# Patient Record
Sex: FEMALE | Race: WHITE | Employment: OTHER | URBAN - METROPOLITAN AREA
[De-identification: names, ages, dates, MRNs, and addresses within clinical notes are randomized per-mention and may not be internally consistent; named-entity substitution may affect disease eponyms.]

---

## 2019-05-22 ENCOUNTER — HOSPITAL ENCOUNTER (OUTPATIENT)
Age: 64
Discharge: HOME | End: 2019-05-24
Payer: COMMERCIAL

## 2019-05-22 PROCEDURE — 87660 TRICHOMONAS VAGIN DIR PROBE: CPT

## 2019-05-22 PROCEDURE — 87510 GARDNER VAG DNA DIR PROBE: CPT

## 2019-05-22 PROCEDURE — 87480 CANDIDA DNA DIR PROBE: CPT

## 2019-05-24 LAB
CANDIDA SPECIES, DNA PROBE: NEGATIVE
GARDNERELLA VAGINALIS, DNA PROBE: NEGATIVE
TRICHOMONAS VAGINALIS DNA: NEGATIVE

## 2021-09-23 ENCOUNTER — HOSPITAL ENCOUNTER (OUTPATIENT)
Age: 66
Discharge: HOME | End: 2021-09-25

## 2021-09-23 PROCEDURE — 88305 TISSUE EXAM BY PATHOLOGIST: CPT

## 2022-10-04 ENCOUNTER — HOSPITAL ENCOUNTER (OUTPATIENT)
Dept: GENERAL RADIOLOGY | Age: 67
Discharge: HOME | End: 2022-10-06
Payer: MEDICARE

## 2022-10-04 DIAGNOSIS — Z78.0 ASYMPTOMATIC AGE-RELATED POSTMENOPAUSAL STATE: ICD-10-CM

## 2022-10-04 PROCEDURE — 77080 DXA BONE DENSITY AXIAL: CPT

## 2022-10-24 ENCOUNTER — PREP FOR PROCEDURE (OUTPATIENT)
Dept: PODIATRY | Age: 67
End: 2022-10-24

## 2022-10-24 RX ORDER — SODIUM CHLORIDE 0.9 % (FLUSH) 0.9 %
5-40 SYRINGE (ML) INJECTION PRN
Status: CANCELLED | OUTPATIENT
Start: 2022-10-24

## 2022-10-24 RX ORDER — SODIUM CHLORIDE 0.9 % (FLUSH) 0.9 %
5-40 SYRINGE (ML) INJECTION EVERY 12 HOURS SCHEDULED
Status: CANCELLED | OUTPATIENT
Start: 2022-10-24

## 2022-10-24 RX ORDER — SODIUM CHLORIDE 9 MG/ML
INJECTION, SOLUTION INTRAVENOUS PRN
Status: CANCELLED | OUTPATIENT
Start: 2022-10-24

## 2022-10-25 RX ORDER — LEVOTHYROXINE SODIUM 0.05 MG/1
50 TABLET ORAL DAILY
COMMUNITY
Start: 2022-10-03

## 2022-10-25 RX ORDER — METOPROLOL SUCCINATE 25 MG/1
25 TABLET, EXTENDED RELEASE ORAL DAILY
Status: ON HOLD | COMMUNITY
Start: 2022-09-30 | End: 2022-11-08 | Stop reason: HOSPADM

## 2022-10-25 RX ORDER — ESCITALOPRAM OXALATE 10 MG/1
10 TABLET ORAL DAILY
COMMUNITY
Start: 2022-09-30

## 2022-10-25 RX ORDER — ATORVASTATIN CALCIUM 10 MG/1
10 TABLET, FILM COATED ORAL NIGHTLY
COMMUNITY
Start: 2022-09-30

## 2022-10-25 NOTE — PROGRESS NOTES
[facility] PRE-ADMISSION TESTING INSTRUCTIONS    The Preadmission Testing patient is instructed accordingly using the following criteria (check applicable):    ARRIVAL INSTRUCTIONS:  [x] Parking the day of Surgery is located in the Main Entrance lot. Upon entering the door, make an immediate right to the surgery reception desk    [x] Bring photo ID and insurance card    [x] Bring in a copy of Living will or Durable Power of  papers. [x] Please be sure to arrange for responsible adult to provide transportation to and from the hospital    [x] Please arrange for responsible adult to be with you for the 24 hour period post procedure due to having anesthesia    [x] If you awake am of surgery not feeling well or have temperature >100 please call [phone]    GENERAL INSTRUCTIONS:    [x] Nothing by mouth after midnight, including gum, candy, mints or water     You may brush your teeth, but do not sw[x]allow any water    [x] Take medications as instructed with 1-2 oz of water    [x] Stop herbal supplements and vitamins 5 days prior to procedure    [] Follow preop dosing of blood thinners per physician instructions    [] Take 1/2 dose of evening insulin, but no insulin after midnight    [] No oral diabetic medications after midnight    [] If diabetic and have low blood sugar or feel symptomatic, take 1-2oz apple juice only    [] Bring inhalers day of surgery    [] Bring C-PAP/ Bi-Pap day of surgery    [] Bring urine specimen day of surgery    [x] Shower or bath with soap, lather and rinse well, AM of Surgery, no lotion, powders or creams to surgical site    [] Follow bowel prep as instructed per surgeon    [x] No tobacco products within 24 hours of surgery     [x] No alcohol or illegal drug use within 24 hours of surgery.     [x] Jewelry, body piercing's, eyeglasses, contact lenses and dentures are not permitted into surgery (bring cases)      [x] Please do not wear any nail polish, make up or hair products on the day of surgery    [x] You can expect a call the business day prior to procedure to notify you if your arrival time changes    [x] If you receive a survey after surgery we would greatly appreciate your comments    [] Parent/guardian of a minor must accompany their child and remain on the premises  the entire time they are under our care     [] Pediatric patients may bring favorite toy, blanket or comfort item with them    [] A caregiver or family member must remain with the patient during their stay if they are mentally handicapped, have dementia, disoriented or unable to use a call light or would be a safety concern if left unattended    [x] Please notify surgeon if you develop any illness between now and time of surgery (cold, cough, sore throat, fever, nausea, vomiting) or any signs of infections  including skin, wounds, and dental.    [x]  The Outpatient Pharmacy is available to fill your prescription here on your day of surgery, ask your preop nurse for details    [] Other instructions    EDUCATIONAL MATERIALS PROVIDED:    [] PAT Preoperative Education Packet/Booklet     [] Medication List    [] Transfusion bracelet applied with instructions    [] Shower with soap, lather and rinse well, and use CHG wipes provided the evening before surgery as instructed    [] Incentive spirometer with instructions

## 2022-10-26 ENCOUNTER — ANESTHESIA (OUTPATIENT)
Dept: OPERATING ROOM | Age: 67
End: 2022-10-26
Payer: MEDICARE

## 2022-10-26 ENCOUNTER — APPOINTMENT (OUTPATIENT)
Dept: GENERAL RADIOLOGY | Age: 67
End: 2022-10-26
Attending: PODIATRIST
Payer: MEDICARE

## 2022-10-26 ENCOUNTER — ANESTHESIA EVENT (OUTPATIENT)
Dept: OPERATING ROOM | Age: 67
End: 2022-10-26
Payer: MEDICARE

## 2022-10-26 ENCOUNTER — HOSPITAL ENCOUNTER (OUTPATIENT)
Age: 67
Setting detail: OUTPATIENT SURGERY
Discharge: HOME | End: 2022-10-26
Attending: PODIATRIST | Admitting: PODIATRIST
Payer: MEDICARE

## 2022-10-26 VITALS
RESPIRATION RATE: 18 BRPM | DIASTOLIC BLOOD PRESSURE: 73 MMHG | HEART RATE: 55 BPM | BODY MASS INDEX: 33.04 KG/M2 | OXYGEN SATURATION: 96 % | TEMPERATURE: 97.6 F | SYSTOLIC BLOOD PRESSURE: 154 MMHG | HEIGHT: 61 IN | WEIGHT: 175 LBS

## 2022-10-26 PROCEDURE — 2500000003 HC RX 250 WO HCPCS: Performed by: PODIATRIST

## 2022-10-26 PROCEDURE — 3700000000 HC ANESTHESIA ATTENDED CARE: Performed by: PODIATRIST

## 2022-10-26 PROCEDURE — 2580000003 HC RX 258: Performed by: PODIATRIST

## 2022-10-26 PROCEDURE — 7100000001 HC PACU RECOVERY - ADDTL 15 MIN: Performed by: PODIATRIST

## 2022-10-26 PROCEDURE — 7100000000 HC PACU RECOVERY - FIRST 15 MIN: Performed by: PODIATRIST

## 2022-10-26 PROCEDURE — 3600000012 HC SURGERY LEVEL 2 ADDTL 15MIN: Performed by: PODIATRIST

## 2022-10-26 PROCEDURE — 3209999900 FLUORO FOR SURGICAL PROCEDURES

## 2022-10-26 PROCEDURE — 7100000011 HC PHASE II RECOVERY - ADDTL 15 MIN: Performed by: PODIATRIST

## 2022-10-26 PROCEDURE — 2709999900 HC NON-CHARGEABLE SUPPLY: Performed by: PODIATRIST

## 2022-10-26 PROCEDURE — 6360000002 HC RX W HCPCS: Performed by: PODIATRIST

## 2022-10-26 PROCEDURE — 3700000001 HC ADD 15 MINUTES (ANESTHESIA): Performed by: PODIATRIST

## 2022-10-26 PROCEDURE — 2720000010 HC SURG SUPPLY STERILE: Performed by: PODIATRIST

## 2022-10-26 PROCEDURE — 6360000002 HC RX W HCPCS

## 2022-10-26 PROCEDURE — 2500000003 HC RX 250 WO HCPCS

## 2022-10-26 PROCEDURE — 3600000002 HC SURGERY LEVEL 2 BASE: Performed by: PODIATRIST

## 2022-10-26 PROCEDURE — 7100000010 HC PHASE II RECOVERY - FIRST 15 MIN: Performed by: PODIATRIST

## 2022-10-26 PROCEDURE — 6370000000 HC RX 637 (ALT 250 FOR IP): Performed by: PODIATRIST

## 2022-10-26 PROCEDURE — C1713 ANCHOR/SCREW BN/BN,TIS/BN: HCPCS | Performed by: PODIATRIST

## 2022-10-26 DEVICE — IMPLANTABLE DEVICE
Type: IMPLANTABLE DEVICE | Site: FOOT | Status: FUNCTIONAL
Brand: CHARLOTTE

## 2022-10-26 DEVICE — HEADED COMPRESSION SCREW
Type: IMPLANTABLE DEVICE | Site: FOOT | Status: FUNCTIONAL
Brand: DART-FIRE

## 2022-10-26 RX ORDER — EPHEDRINE SULFATE/0.9% NACL/PF 50 MG/5 ML
SYRINGE (ML) INTRAVENOUS PRN
Status: DISCONTINUED | OUTPATIENT
Start: 2022-10-26 | End: 2022-10-26 | Stop reason: SDUPTHER

## 2022-10-26 RX ORDER — DEXAMETHASONE SODIUM PHOSPHATE 4 MG/ML
INJECTION, SOLUTION INTRA-ARTICULAR; INTRALESIONAL; INTRAMUSCULAR; INTRAVENOUS; SOFT TISSUE PRN
Status: DISCONTINUED | OUTPATIENT
Start: 2022-10-26 | End: 2022-10-26 | Stop reason: SDUPTHER

## 2022-10-26 RX ORDER — SODIUM CHLORIDE 9 MG/ML
INJECTION, SOLUTION INTRAVENOUS PRN
Status: DISCONTINUED | OUTPATIENT
Start: 2022-10-26 | End: 2022-10-26 | Stop reason: HOSPADM

## 2022-10-26 RX ORDER — BUPIVACAINE HYDROCHLORIDE 5 MG/ML
INJECTION, SOLUTION EPIDURAL; INTRACAUDAL PRN
Status: DISCONTINUED | OUTPATIENT
Start: 2022-10-26 | End: 2022-10-26 | Stop reason: ALTCHOICE

## 2022-10-26 RX ORDER — BACITRACIN ZINC 500 [USP'U]/G
OINTMENT TOPICAL PRN
Status: DISCONTINUED | OUTPATIENT
Start: 2022-10-26 | End: 2022-10-26 | Stop reason: ALTCHOICE

## 2022-10-26 RX ORDER — SODIUM CHLORIDE 0.9 % (FLUSH) 0.9 %
5-40 SYRINGE (ML) INJECTION PRN
Status: DISCONTINUED | OUTPATIENT
Start: 2022-10-26 | End: 2022-10-26 | Stop reason: HOSPADM

## 2022-10-26 RX ORDER — LIDOCAINE HYDROCHLORIDE AND EPINEPHRINE BITARTRATE 20; .01 MG/ML; MG/ML
INJECTION, SOLUTION SUBCUTANEOUS PRN
Status: DISCONTINUED | OUTPATIENT
Start: 2022-10-26 | End: 2022-10-26 | Stop reason: ALTCHOICE

## 2022-10-26 RX ORDER — MIDAZOLAM HYDROCHLORIDE 1 MG/ML
INJECTION INTRAMUSCULAR; INTRAVENOUS PRN
Status: DISCONTINUED | OUTPATIENT
Start: 2022-10-26 | End: 2022-10-26 | Stop reason: SDUPTHER

## 2022-10-26 RX ORDER — PROPOFOL 10 MG/ML
INJECTION, EMULSION INTRAVENOUS CONTINUOUS PRN
Status: DISCONTINUED | OUTPATIENT
Start: 2022-10-26 | End: 2022-10-26 | Stop reason: SDUPTHER

## 2022-10-26 RX ORDER — KETOROLAC TROMETHAMINE 30 MG/ML
INJECTION, SOLUTION INTRAMUSCULAR; INTRAVENOUS PRN
Status: DISCONTINUED | OUTPATIENT
Start: 2022-10-26 | End: 2022-10-26 | Stop reason: SDUPTHER

## 2022-10-26 RX ORDER — LIDOCAINE HYDROCHLORIDE 20 MG/ML
INJECTION, SOLUTION INFILTRATION; PERINEURAL PRN
Status: DISCONTINUED | OUTPATIENT
Start: 2022-10-26 | End: 2022-10-26 | Stop reason: ALTCHOICE

## 2022-10-26 RX ORDER — SUCCINYLCHOLINE/SOD CL,ISO/PF 100 MG/5ML
SYRINGE (ML) INTRAVENOUS PRN
Status: DISCONTINUED | OUTPATIENT
Start: 2022-10-26 | End: 2022-10-26 | Stop reason: SDUPTHER

## 2022-10-26 RX ORDER — ROCURONIUM BROMIDE 10 MG/ML
INJECTION, SOLUTION INTRAVENOUS PRN
Status: DISCONTINUED | OUTPATIENT
Start: 2022-10-26 | End: 2022-10-26 | Stop reason: SDUPTHER

## 2022-10-26 RX ORDER — ONDANSETRON 2 MG/ML
INJECTION INTRAMUSCULAR; INTRAVENOUS PRN
Status: DISCONTINUED | OUTPATIENT
Start: 2022-10-26 | End: 2022-10-26 | Stop reason: SDUPTHER

## 2022-10-26 RX ORDER — SODIUM CHLORIDE 0.9 % (FLUSH) 0.9 %
5-40 SYRINGE (ML) INJECTION EVERY 12 HOURS SCHEDULED
Status: DISCONTINUED | OUTPATIENT
Start: 2022-10-26 | End: 2022-10-26 | Stop reason: HOSPADM

## 2022-10-26 RX ADMIN — SUGAMMADEX 159 MG: 100 INJECTION, SOLUTION INTRAVENOUS at 09:40

## 2022-10-26 RX ADMIN — SODIUM CHLORIDE: 9 INJECTION, SOLUTION INTRAVENOUS at 07:00

## 2022-10-26 RX ADMIN — Medication 15 MG: at 07:46

## 2022-10-26 RX ADMIN — MIDAZOLAM 2 MG: 1 INJECTION INTRAMUSCULAR; INTRAVENOUS at 06:58

## 2022-10-26 RX ADMIN — KETOROLAC TROMETHAMINE 30 MG: 30 INJECTION, SOLUTION INTRAMUSCULAR at 09:40

## 2022-10-26 RX ADMIN — DEXAMETHASONE SODIUM PHOSPHATE 8 MG: 4 INJECTION, SOLUTION INTRAMUSCULAR; INTRAVENOUS at 07:55

## 2022-10-26 RX ADMIN — Medication 200 MG: at 07:20

## 2022-10-26 RX ADMIN — ONDANSETRON 4 MG: 2 INJECTION INTRAMUSCULAR; INTRAVENOUS at 07:55

## 2022-10-26 RX ADMIN — PROPOFOL 100 MCG/KG/MIN: 10 INJECTION, EMULSION INTRAVENOUS at 07:04

## 2022-10-26 RX ADMIN — WATER 2000 MG: 1 INJECTION INTRAMUSCULAR; INTRAVENOUS; SUBCUTANEOUS at 07:05

## 2022-10-26 RX ADMIN — ROCURONIUM BROMIDE 20 MG: 10 INJECTION, SOLUTION INTRAVENOUS at 07:52

## 2022-10-26 RX ADMIN — Medication 15 MG: at 08:28

## 2022-10-26 RX ADMIN — SODIUM CHLORIDE: 9 INJECTION, SOLUTION INTRAVENOUS at 08:22

## 2022-10-26 ASSESSMENT — PAIN - FUNCTIONAL ASSESSMENT: PAIN_FUNCTIONAL_ASSESSMENT: NONE - DENIES PAIN

## 2022-10-26 ASSESSMENT — PAIN SCALES - GENERAL
PAINLEVEL_OUTOF10: 0

## 2022-10-26 NOTE — ANESTHESIA POSTPROCEDURE EVALUATION
Department of Anesthesiology  Postprocedure Note    Patient: Samy Telles  MRN: [id number]  YOB: 1955  Date of evaluation: 10/26/2022      Procedure Summary     Date: 10/26/22 Room / Location: [facility] OR 07 / [facility]    Anesthesia Start: 6179 Anesthesia Stop:     Procedure: REDUCTION OF HALLUX VALGUS DEFORMITY AND SUBLUXED 2ND 3RD LEFT TOES CORRECTION OF HALLUX VALGUS BY JAROD PROCEDURE WEIL OSTEOTOMY 2ND METATARSAL SOFT TISSUE RELEASE 3RD METATARSOPHALANGEAL JOINT ALL LEFT FOOT (Left: Foot) Diagnosis:       Bunion of great toe of left foot      Subluxation of metatarsophalangeal joint of lesser toe of left foot, initial encounter      Hammer toe of left foot      (Bunion of great toe of left foot [M21.612])      (Subluxation of metatarsophalangeal joint of lesser toe of left foot, initial encounter [E89.672M])      (Hammer toe of left foot [M20.42])    Surgeons: Mary Hood DPM Responsible Provider: Shreya Au MD    Anesthesia Type: MAC ASA Status: 2          Anesthesia Type: No value filed.     Julisa Phase I: Julisa Score: 10    Julisa Phase II:        Anesthesia Post Evaluation    Patient location during evaluation: PACU  Patient participation: complete - patient participated  Level of consciousness: awake  Pain score: 3  Airway patency: patent  Nausea & Vomiting: no nausea and no vomiting  Complications: no  Cardiovascular status: blood pressure returned to baseline  Respiratory status: acceptable  Hydration status: euvolemic

## 2022-10-26 NOTE — BRIEF OP NOTE
Brief Postoperative Note      Patient: Garry Willis  YOB: 1955  MRN: [id number]    Date of Procedure: 10/26/2022    Pre-Op Diagnosis: Bunion of great toe of left foot [M21.612]  Subluxation of metatarsophalangeal joint of lesser toe of left foot, initial encounter [S93.145A]  Hammer toe of left foot [M20.42]    Post-Op Diagnosis: Same       Procedure(s):  REDUCTION OF HALLUX VALGUS DEFORMITY AND SUBLUXED 2ND 3RD LEFT TOES CORRECTION OF HALLUX VALGUS BY JAROD PROCEDURE WEIL OSTEOTOMY 2ND METATARSAL SOFT TISSUE RELEASE 3RD METATARSOPHALANGEAL JOINT 4TH TOE CAPSULOTOMY ALL LEFT FOOT    Surgeon(s):  LUPE Wilder DPM    Assistant: Devin Vivar DPM, PGY-2  * No surgical staff found *    Anesthesia: General    Estimated Blood Loss (mL): less than 50     Complications: Patient was converted to general anesthesia    Specimens:   * No specimens in log *    Implants:  Implant Name Type Inv.  Item Serial No.  Lot No. LRB No. Used Action   SCREW BNE SM L18MM DIA2MM AQUA TI ST SELF DRL ANDRE [facility] - [id number]  SCREW BNE SM L18MM DIA2MM AQUA TI ST SELF DRL ANDRE [facility]  [employer] [facility]  Left 1 Implanted   STAPLE BNE FIX L9MM S STL INTERAXIS [employer]UIK CAMEL[employer] - [id number]  STAPLE BNE FIX L9MM S STL INTERAXIS QUIK CAMELIA  [employer] [facility]  Left 1 Implanted         Drains: * No LDAs found *    Findings: See full op report    Electronically signed by Devin Vivar DPM on 10/26/2022 at 10:05 AM

## 2022-10-26 NOTE — OP NOTE
Operative Note      Patient: Garry Willis  YOB: 1955  MRN: [id number]    Date of Procedure: 10/26/2022    Pre-Op Diagnosis: Bunion of great toe of left foot [M21.612]  Subluxation of metatarsophalangeal joint of lesser toe of left foot, initial encounter [S93.145A]  Hammer toe of left foot [M20.42]    Post-Op Diagnosis: Same       Procedure(s):  REDUCTION OF HALLUX VALGUS DEFORMITY AND SUBLUXED 2ND 3RD LEFT TOES CORRECTION OF HALLUX VALGUS BY JAROD PROCEDURE WEIL OSTEOTOMY 2ND METATARSAL SOFT TISSUE RELEASE 3RD METATARSOPHALANGEAL JOINT 4TH TOE CAPSULOTOMY ALL LEFT FOOT    Surgeon(s):  LUPE Wilder DPM    Assistant: Devin Vivar DPM, PGY-2  * No surgical staff found *    Anesthesia: General    Estimated Blood Loss (mL): less than 50     Complications: None    Specimens:   * No specimens in log *    Implants:  Implant Name Type Inv. Item Serial No.  Lot No. LRB No. Used Action   SCREW BNE SM L18MM DIA2MM AQUA TI ST SELF DRL ANDRE [employer] - [id number]  SCREW BNE SM L18MM DIA2MM AQUA TI ST SELF DRL ANDRE [employer]  [employer] [facility]  Left 1 Implanted   STAPLE BNE FIX L9MM S STL INTERAXIS QUIK CAMELIA - [id number]  STAPLE BNE FIX L9MM S STL INTERAXIS QUIK CAMELIA  [employer] [facility]  Left 1 Implanted         Drains: * No LDAs found *    Findings: hallux valgus deformity of great toe with deviated and subluxed 2,3,and 4th digits    Detailed Description of Procedure: Following satisfactory pre-op evaluation the patient was brought into the OR and placed on the OR table in the supine position. General sedation was administered by anesthesia. Following sedation a time out was then called identifying patient identity, procedure, operative location, birth date, allergies, antibiotics and other pertinent information in regards to the surgery to which everyone agreed.   The foot was then prepped and draped in the usual sterile manner and lowered onto the surgical field. 18 cc of local anesthetic was injected in both a soliman block and ring block technique around digits 1,2,3,4. Attention was directed to MPJs 2 and 3 where linear longitudinal incision were made across the joints. The incision was deepened through the subcutaneous tissues and care being taken to identify and retract all vital neurovascular structures. All bleeders were cauterized as necessary. The entensor tendons were then incised across the 2nd and 3rd MPJs and a small section of the extensor tendons were resected. Attention was then directed to the 2nd MPJ. Using a #15 blade, a capsular and periosteal incision was then made linearly over the 2nd metatarsal head. The capsule and periosteal structures were meticulously reflected both dorsally, medially and laterally until excellent soft tissue exposure had been achieved. Next, a small McGlamary elevator was placed underneath the 2nd metatarsal head to free the metatarsal head from its plantar attachments. Next, a through and through osteotomy was created beginning at the dorsal junction of the articular cartilage and angled from dorsal-distal to plantar-proximal parallel to the weightbearing surface. Upon completion of the osteotomy, the head of the metatarsal was transposed approximately 3 mm proximally. The remaining articular cartilage overhanging transversely following the osteotomy was resected utilizing a bone rongeur. The osteotomy was temporarily fixated utilizing a 0.045in K wire. The initial k wire did break during placement and removal was attempted without success, but this k wire was noted to be in the bone on fluoroscopy and left intact to avoid further destruction of bone attempting to retrieve it. Live intraoperative C-arm fluoroscopy was utilized to evaluate the alignment of the osteotomy. Excellent restoration of the metatarsal parabola was observed.   Attention was then directed toward fixation of prominences was resected and passed from the operative field. Attention was then directed to the first interspace via the original skin incision. Sharp and blunt dissection was utilized to reach and visualize the conjoined adductor tendon which was then transected, as well as the fibular sesamoidal ligament. It then apparent that the lateral contracture present on the hallux was significantly reduced. Attention was then directed to the left hallucal IPJ. The capsule was then reflected from the base of the proximal phalanx using a freer and adequate exposure of the phalanx was noted to perform the osteotomy. Using a Sagittal saw, a wedge of the proximal phalanx was removed with the base of the wedge facing medially and the apex lateral to the lateral cortex with the lateral cortex kept intact. This wedge was in the base of the proximal phalanx of the hallux. Using manual compression, the wedged gap of bone was reduced. While holding the osteotomy reduction manually , using the drill guide and hand drill from the [employer] instrument tray, the proximal phalanx was drilled both proximal and distal to the wedge osteotomy with a pull pin being inserted into each hole afterwards to maintain visualization of the hole. The drill guide was then removed and the 9 mm x 9 mm staple Implant was inserted into the drill holes after removing the pull pins. A tamp, from the instrument kit, was used to fully seat the implant against the bony surface of the proximal phalanx using a surgical mallet. Great compression was noted with the placement of the staple across the osteotomy site. Correction of the deformity and position of the hardware were assessed at this time and noted to be excellent. The wound sites were flushed with copious sterile saline. 4-0 vicryl was used to reapproximate the capsule, EHL tendon, and subcutaneous tissue.   3-0 Nylon was used to reapproximate the skin of the three incisions in a simple interrupted technique. The wound was then dressed in bacitracin ointment, adaptic, 4x4, kerlex, ACE wrap and a CAM boot was applied. The patient was noted to have vomiting before the procedure began so anesthesia converted the case into general anesthesia from [place] [address]. Aside from this incident, the patient tolerated the procedure and general anesthesia well. The patient left the OR with vital signs stable and CFT intact to digits.  Following a brief period of post anesthesia monitoring the patient will be released from the hospital.     Electronically signed by Trinidad Alvarado DPM on 10/26/2022 at 10:09 AM

## 2022-10-26 NOTE — ANESTHESIA PRE PROCEDURE
Department of Anesthesiology  Preprocedure Note       Name:  Garry Willis   Age:  74 y.o.  :  1955                                          MRN:  [id number]         Date:  10/26/2022      Surgeon: Yazmin Fox):  Tomas Walsh DPM    Procedure: Procedure(s):  REDUCTION OF HALLUX VALGUS DEFORMITY AND SUBLUXED 2ND 3RD LEFT TOES CORRECTION OF HALLUX VALGUS BY JAROD PROCEDURE WEIL OSTEOTOMY 2ND METATARSAL SOFT TISSUE RELEASE 3RD METATARSOPHALANGEAL JOINT ALL LEFT FOOT    Medications prior to admission:   Prior to Admission medications    Medication Sig Start Date End Date Taking? Authorizing Provider   levothyroxine (SYNTHROID) 50 MCG tablet  10/3/22   Historical Provider, MD   metoprolol succinate (TOPROL XL) 25 MG extended release tablet  22   Historical Provider, MD   escitalopram (LEXAPRO) 10 MG tablet  22   Historical Provider, MD   atorvastatin (LIPITOR) 10 MG tablet Take 10 mg by mouth nightly 22   Historical Provider, MD   therapeutic multivitamin-minerals (THERAGRAN-M) tablet Take 1 tablet by mouth daily. Historical Provider, MD       Current medications:    Current Facility-Administered Medications   Medication Dose Route Frequency Provider Last Rate Last Admin    sodium chloride flush 0.9 % injection 5-40 mL  5-40 mL IntraVENous 2 times per day Tomas Walsh DPM        sodium chloride flush 0.9 % injection 5-40 mL  5-40 mL IntraVENous PRN Tomas Walsh DPM        0.9 % sodium chloride infusion   IntraVENous PRN Tomas Walsh DPM        ceFAZolin (ANCEF) 2,000 mg in sterile water 20 mL IV syringe  2,000 mg IntraVENous [facility]           Allergies:  No Known Allergies    Problem List:  There is no problem list on file for this patient.       Past Medical History:        Diagnosis Date    Hypertension     Thyroid disease        Past Surgical History:        Procedure Laterality Date    HYSTERECTOMY (CERVIX STATUS UNKNOWN)  LASIK Bilateral 2015    TUBAL LIGATION         Social History:    Social History     Tobacco Use    Smoking status: Never    Smokeless tobacco: Never   Substance Use Topics    Alcohol use: Not Currently     Comment: social                                Counseling given: Not Answered      Vital Signs (Current):   Vitals:    10/25/22 1024 10/26/22 0602   BP:  (!) 197/96   Pulse:  54   Resp:  16   Temp:  36.2 °C (97.2 °F)   TempSrc:  Oral   SpO2:  97%   Weight: 175 lb (79.4 kg) 175 lb (79.4 kg)   Height: 5' 1\" (1.549 m) 5' 1\" (1.549 m)                                              BP Readings from Last 3 Encounters:   10/26/22 (!) 197/96       NPO Status: Time of last liquid consumption: 1700                        Time of last solid consumption: 1700                        Date of last liquid consumption: 10/25/22                        Date of last solid food consumption: 10/25/22    BMI:   Wt Readings from Last 3 Encounters:   10/26/22 175 lb (79.4 kg)     Body mass index is 33.07 kg/m². CBC: No results found for: WBC, RBC, HGB, HCT, MCV, RDW, PLT    CMP: No results found for: NA, K, CL, CO2, BUN, CREATININE, GFRAA, AGRATIO, LABGLOM, GLUCOSE, GLU, PROT, CALCIUM, BILITOT, ALKPHOS, AST, ALT    POC Tests: No results for input(s): POCGLU, POCNA, POCK, POCCL, POCBUN, POCHEMO, POCHCT in the last 72 hours.     Coags: No results found for: PROTIME, INR, APTT    HCG (If Applicable): No results found for: PREGTESTUR, PREGSERUM, HCG, HCGQUANT     ABGs: No results found for: PHART, PO2ART, MEP5KNN, NZN8ANQ, BEART, [id number]     Type & Screen (If Applicable):  No results found for: LABABO, LABRH    Drug/Infectious Status (If Applicable):  No results found for: HIV, HEPCAB    COVID-19 Screening (If Applicable): No results found for: COVID19        Anesthesia Evaluation  Patient summary reviewed and Nursing notes reviewed no history of anesthetic complications:   Airway: Mallampati: II  TM distance: >3 FB   Neck ROM: full  Mouth opening: > = 3 FB   Dental:    (+) implants      Pulmonary: breath sounds clear to auscultation  (+) asthma: allergic asthma,                            Cardiovascular:  Exercise tolerance: good (>4 METS),   (+) hypertension:, hyperlipidemia        Rhythm: regular  Rate: normal           Beta Blocker:  Dose within 24 Hrs         Neuro/Psych:   (+) depression/anxiety             GI/Hepatic/Renal:   (+) hiatal hernia,           Endo/Other:    (+) hypothyroidism::., .                 Abdominal:             Vascular: negative vascular ROS. Other Findings:           Anesthesia Plan      MAC     ASA 2       Induction: intravenous. Anesthetic plan and risks discussed with patient. Plan discussed with attending. Ariadna Alvarado, APRN - CRNA   10/26/2022     NO CHANGE I H&P. Pt seen, examined, chart reviewed, plan discussed.   Kevyn Montesinos MD  10/26/2022  7:36 AM

## 2022-11-04 ENCOUNTER — HOSPITAL ENCOUNTER (OUTPATIENT)
Age: 67
Discharge: HOME | End: 2022-11-04
Payer: MEDICARE

## 2022-11-04 ENCOUNTER — HOSPITAL ENCOUNTER (INPATIENT)
Age: 67
LOS: 5 days | Discharge: HOME HEALTH | DRG: 863 | End: 2022-11-09
Attending: EMERGENCY MEDICINE | Admitting: INTERNAL MEDICINE
Payer: MEDICARE

## 2022-11-04 ENCOUNTER — APPOINTMENT (OUTPATIENT)
Dept: CT IMAGING | Age: 67
DRG: 863 | End: 2022-11-04
Payer: MEDICARE

## 2022-11-04 ENCOUNTER — APPOINTMENT (OUTPATIENT)
Dept: GENERAL RADIOLOGY | Age: 67
DRG: 863 | End: 2022-11-04
Payer: MEDICARE

## 2022-11-04 DIAGNOSIS — L08.9 LEFT FOOT INFECTION: Primary | ICD-10-CM

## 2022-11-04 PROBLEM — E78.5 HLD (HYPERLIPIDEMIA): Status: ACTIVE | Noted: 2022-11-04

## 2022-11-04 PROBLEM — T81.40XA POST OP INFECTION: Status: ACTIVE | Noted: 2022-11-04

## 2022-11-04 PROBLEM — I10 HYPERTENSION: Status: ACTIVE | Noted: 2022-11-04

## 2022-11-04 LAB
ALBUMIN SERPL-MCNC: 4.5 G/DL (ref 3.5–5.2)
ALP BLD-CCNC: 119 U/L (ref 35–104)
ALT SERPL-CCNC: 32 U/L (ref 0–32)
ANION GAP SERPL CALCULATED.3IONS-SCNC: 16 MMOL/L (ref 7–16)
AST SERPL-CCNC: 38 U/L (ref 0–31)
BACTERIA: ABNORMAL /HPF
BASOPHILS ABSOLUTE: 0.04 E9/L (ref 0–0.2)
BASOPHILS RELATIVE PERCENT: 0.4 % (ref 0–2)
BILIRUB SERPL-MCNC: 1.2 MG/DL (ref 0–1.2)
BILIRUBIN URINE: NEGATIVE
BLOOD, URINE: ABNORMAL
BUN BLDV-MCNC: 10 MG/DL (ref 6–23)
C-REACTIVE PROTEIN: 2.8 MG/DL (ref 0–0.4)
CALCIUM SERPL-MCNC: 9.6 MG/DL (ref 8.6–10.2)
CHLORIDE BLD-SCNC: 103 MMOL/L (ref 98–107)
CLARITY: CLEAR
CO2: 21 MMOL/L (ref 22–29)
COLOR: YELLOW
CREAT SERPL-MCNC: 0.7 MG/DL (ref 0.5–1)
EOSINOPHILS ABSOLUTE: 0.05 E9/L (ref 0.05–0.5)
EOSINOPHILS RELATIVE PERCENT: 0.5 % (ref 0–6)
EPITHELIAL CELLS, UA: ABNORMAL /HPF
GFR SERPL CREATININE-BSD FRML MDRD: >60 ML/MIN/1.73
GLUCOSE BLD-MCNC: 118 MG/DL (ref 74–99)
GLUCOSE URINE: NEGATIVE MG/DL
HCT VFR BLD CALC: 40.7 % (ref 34–48)
HCT VFR BLD CALC: 40.8 % (ref 34–48)
HEMOGLOBIN: 13.6 G/DL (ref 11.5–15.5)
HEMOGLOBIN: 13.6 G/DL (ref 11.5–15.5)
IMMATURE GRANULOCYTES #: 0.04 E9/L
IMMATURE GRANULOCYTES %: 0.4 % (ref 0–5)
KETONES, URINE: 40 MG/DL
LEUKOCYTE ESTERASE, URINE: ABNORMAL
LIPASE: 32 U/L (ref 13–60)
LYMPHOCYTES ABSOLUTE: 1.54 E9/L (ref 1.5–4)
LYMPHOCYTES RELATIVE PERCENT: 16 % (ref 20–42)
MAGNESIUM: 2 MG/DL (ref 1.6–2.6)
MCH RBC QN AUTO: 28.9 PG (ref 26–35)
MCH RBC QN AUTO: 29.6 PG (ref 26–35)
MCHC RBC AUTO-ENTMCNC: 33.3 % (ref 32–34.5)
MCHC RBC AUTO-ENTMCNC: 33.4 % (ref 32–34.5)
MCV RBC AUTO: 86.8 FL (ref 80–99.9)
MCV RBC AUTO: 88.7 FL (ref 80–99.9)
MONOCYTES ABSOLUTE: 0.45 E9/L (ref 0.1–0.95)
MONOCYTES RELATIVE PERCENT: 4.7 % (ref 2–12)
NEUTROPHILS ABSOLUTE: 7.49 E9/L (ref 1.8–7.3)
NEUTROPHILS RELATIVE PERCENT: 78 % (ref 43–80)
NITRITE, URINE: NEGATIVE
PDW BLD-RTO: 13 FL (ref 11.5–15)
PDW BLD-RTO: 13.2 FL (ref 11.5–15)
PH UA: 5.5 (ref 5–9)
PLATELET # BLD: 288 E9/L (ref 130–450)
PLATELET # BLD: 290 E9/L (ref 130–450)
PMV BLD AUTO: 9.6 FL (ref 7–12)
PMV BLD AUTO: 9.8 FL (ref 7–12)
POTASSIUM SERPL-SCNC: 4.1 MMOL/L (ref 3.5–5)
PROTEIN UA: NEGATIVE MG/DL
RBC # BLD: 4.59 E12/L (ref 3.5–5.5)
RBC # BLD: 4.7 E12/L (ref 3.5–5.5)
RBC UA: ABNORMAL /HPF (ref 0–2)
SEDIMENTATION RATE, ERYTHROCYTE: 12 MM/HR (ref 0–20)
SODIUM BLD-SCNC: 140 MMOL/L (ref 132–146)
SPECIFIC GRAVITY UA: >=1.03 (ref 1–1.03)
TOTAL PROTEIN: 7.3 G/DL (ref 6.4–8.3)
UROBILINOGEN, URINE: 0.2 E.U./DL
WBC # BLD: 9.1 E9/L (ref 4.5–11.5)
WBC # BLD: 9.6 E9/L (ref 4.5–11.5)
WBC UA: ABNORMAL /HPF (ref 0–5)

## 2022-11-04 PROCEDURE — 96374 THER/PROPH/DIAG INJ IV PUSH: CPT

## 2022-11-04 PROCEDURE — 80053 COMPREHEN METABOLIC PANEL: CPT

## 2022-11-04 PROCEDURE — 87077 CULTURE AEROBIC IDENTIFY: CPT

## 2022-11-04 PROCEDURE — 73700 CT LOWER EXTREMITY W/O DYE: CPT

## 2022-11-04 PROCEDURE — 81001 URINALYSIS AUTO W/SCOPE: CPT

## 2022-11-04 PROCEDURE — 73630 X-RAY EXAM OF FOOT: CPT

## 2022-11-04 PROCEDURE — 99285 EMERGENCY DEPT VISIT HI MDM: CPT

## 2022-11-04 PROCEDURE — 36415 COLL VENOUS BLD VENIPUNCTURE: CPT

## 2022-11-04 PROCEDURE — 1200000000 HC SEMI PRIVATE

## 2022-11-04 PROCEDURE — 6370000000 HC RX 637 (ALT 250 FOR IP): Performed by: INTERNAL MEDICINE

## 2022-11-04 PROCEDURE — 96375 TX/PRO/DX INJ NEW DRUG ADDON: CPT

## 2022-11-04 PROCEDURE — 87070 CULTURE OTHR SPECIMN AEROBIC: CPT

## 2022-11-04 PROCEDURE — 85025 COMPLETE CBC W/AUTO DIFF WBC: CPT

## 2022-11-04 PROCEDURE — 2580000003 HC RX 258: Performed by: EMERGENCY MEDICINE

## 2022-11-04 PROCEDURE — 87186 SC STD MICRODIL/AGAR DIL: CPT

## 2022-11-04 PROCEDURE — 85027 COMPLETE CBC AUTOMATED: CPT

## 2022-11-04 PROCEDURE — 83735 ASSAY OF MAGNESIUM: CPT

## 2022-11-04 PROCEDURE — 2500000003 HC RX 250 WO HCPCS: Performed by: EMERGENCY MEDICINE

## 2022-11-04 PROCEDURE — 6360000002 HC RX W HCPCS: Performed by: EMERGENCY MEDICINE

## 2022-11-04 PROCEDURE — 85651 RBC SED RATE NONAUTOMATED: CPT

## 2022-11-04 PROCEDURE — 83690 ASSAY OF LIPASE: CPT

## 2022-11-04 PROCEDURE — 86140 C-REACTIVE PROTEIN: CPT

## 2022-11-04 PROCEDURE — 87040 BLOOD CULTURE FOR BACTERIA: CPT

## 2022-11-04 RX ORDER — LEVOTHYROXINE SODIUM 0.05 MG/1
50 TABLET ORAL DAILY
Status: DISCONTINUED | OUTPATIENT
Start: 2022-11-05 | End: 2022-11-09 | Stop reason: HOSPADM

## 2022-11-04 RX ORDER — ESCITALOPRAM OXALATE 10 MG/1
10 TABLET ORAL DAILY
Status: DISCONTINUED | OUTPATIENT
Start: 2022-11-05 | End: 2022-11-09 | Stop reason: HOSPADM

## 2022-11-04 RX ORDER — ATORVASTATIN CALCIUM 10 MG/1
10 TABLET, FILM COATED ORAL NIGHTLY
Status: DISCONTINUED | OUTPATIENT
Start: 2022-11-04 | End: 2022-11-09 | Stop reason: HOSPADM

## 2022-11-04 RX ORDER — METOPROLOL SUCCINATE 25 MG/1
25 TABLET, EXTENDED RELEASE ORAL DAILY
Status: DISCONTINUED | OUTPATIENT
Start: 2022-11-05 | End: 2022-11-06

## 2022-11-04 RX ORDER — HYDROCODONE BITARTRATE AND ACETAMINOPHEN 5; 325 MG/1; MG/1
1 TABLET ORAL EVERY 4 HOURS PRN
COMMUNITY
Start: 2022-11-01

## 2022-11-04 RX ORDER — SODIUM CHLORIDE 9 MG/ML
125 INJECTION, SOLUTION INTRAVENOUS ONCE
Status: COMPLETED | OUTPATIENT
Start: 2022-11-04 | End: 2022-11-04

## 2022-11-04 RX ORDER — OMADACYCLINE 150 MG/1
TABLET, FILM COATED ORAL
COMMUNITY

## 2022-11-04 RX ORDER — HYDROCODONE BITARTRATE AND ACETAMINOPHEN 5; 325 MG/1; MG/1
1 TABLET ORAL EVERY 4 HOURS PRN
Status: DISCONTINUED | OUTPATIENT
Start: 2022-11-04 | End: 2022-11-09 | Stop reason: HOSPADM

## 2022-11-04 RX ORDER — ONDANSETRON 2 MG/ML
4 INJECTION INTRAMUSCULAR; INTRAVENOUS ONCE
Status: COMPLETED | OUTPATIENT
Start: 2022-11-04 | End: 2022-11-04

## 2022-11-04 RX ORDER — ENOXAPARIN SODIUM 100 MG/ML
40 INJECTION SUBCUTANEOUS DAILY
Status: DISCONTINUED | OUTPATIENT
Start: 2022-11-05 | End: 2022-11-09 | Stop reason: HOSPADM

## 2022-11-04 RX ORDER — LABETALOL HYDROCHLORIDE 5 MG/ML
10 INJECTION, SOLUTION INTRAVENOUS ONCE
Status: COMPLETED | OUTPATIENT
Start: 2022-11-04 | End: 2022-11-04

## 2022-11-04 RX ADMIN — LABETALOL HYDROCHLORIDE 10 MG: 5 INJECTION INTRAVENOUS at 16:41

## 2022-11-04 RX ADMIN — ATORVASTATIN CALCIUM 10 MG: 10 TABLET, FILM COATED ORAL at 21:26

## 2022-11-04 RX ADMIN — ONDANSETRON 4 MG: 2 INJECTION INTRAMUSCULAR; INTRAVENOUS at 16:40

## 2022-11-04 RX ADMIN — HYDROCODONE BITARTRATE AND ACETAMINOPHEN 1 TABLET: 5; 325 TABLET ORAL at 21:26

## 2022-11-04 RX ADMIN — HYDROMORPHONE HYDROCHLORIDE 1 MG: 1 INJECTION, SOLUTION INTRAMUSCULAR; INTRAVENOUS; SUBCUTANEOUS at 16:42

## 2022-11-04 RX ADMIN — SODIUM CHLORIDE 125 ML/HR: 9 INJECTION, SOLUTION INTRAVENOUS at 16:37

## 2022-11-04 RX ADMIN — PIPERACILLIN AND TAZOBACTAM 4500 MG: 4; .5 INJECTION, POWDER, FOR SOLUTION INTRAVENOUS at 20:34

## 2022-11-04 ASSESSMENT — PAIN DESCRIPTION - ORIENTATION
ORIENTATION: LEFT

## 2022-11-04 ASSESSMENT — PAIN - FUNCTIONAL ASSESSMENT
PAIN_FUNCTIONAL_ASSESSMENT: 0-10
PAIN_FUNCTIONAL_ASSESSMENT: 0-10
PAIN_FUNCTIONAL_ASSESSMENT: PREVENTS OR INTERFERES SOME ACTIVE ACTIVITIES AND ADLS

## 2022-11-04 ASSESSMENT — PAIN DESCRIPTION - PAIN TYPE
TYPE: ACUTE PAIN
TYPE: ACUTE PAIN

## 2022-11-04 ASSESSMENT — PAIN DESCRIPTION - FREQUENCY
FREQUENCY: CONTINUOUS
FREQUENCY: CONTINUOUS
FREQUENCY: INTERMITTENT

## 2022-11-04 ASSESSMENT — PAIN DESCRIPTION - DESCRIPTORS
DESCRIPTORS: BURNING

## 2022-11-04 ASSESSMENT — PAIN DESCRIPTION - LOCATION
LOCATION: FOOT

## 2022-11-04 ASSESSMENT — PAIN SCALES - GENERAL
PAINLEVEL_OUTOF10: 10
PAINLEVEL_OUTOF10: 9

## 2022-11-04 ASSESSMENT — PAIN DESCRIPTION - ONSET: ONSET: ON-GOING

## 2022-11-04 NOTE — CONSULTS
9/30/22   Historical Provider, MD   atorvastatin (LIPITOR) 10 MG tablet Take 10 mg by mouth nightly 9/30/22   Historical Provider, MD   therapeutic multivitamin-minerals (THERAGRAN-M) tablet Take 1 tablet by mouth daily. Historical Provider, MD        Patient has no known allergies. OBJECTIVE:        Vitals:    11/04/22 1602   BP: (!) 215/85   Pulse: 72   Resp: 14   Temp: 99 °F (37.2 °C)   SpO2: 100%              EXAM:        Pt is AAOx3, NAD    Vascular Exam:  DP and PT pulses are palpable b/l. CFT is unable to be calculated due to ecchymosis and blistering at distal toes. Bilateral lower extremity. Edema is present to left foot. Neuro Exam:  Light touch and protective sensation diminished. Dermatologic Exam: Three separate incisions with sutures intact and skin well coapted. Erythema, edema, and ecchymosis present to forefoot. No malodor, crepitus noted. Slight fluctuance to dorsal lateral foot. No drainage appreciated. Clinical pictures below:     MSK: Muscle strength 5/5 Bilateral  ROM is full without pain or crepitation bilateral.  Pain on palpation Bilateral foot/feet. No pain with calf squeeze. Posterior compartments soft and compressible. Current Facility-Administered Medications   Medication Dose Route Frequency Provider Last Rate Last Admin    piperacillin-tazobactam (ZOSYN) 4,500 mg in dextrose 5 % 100 mL IVPB (vial-mate)  4,500 mg IntraVENous Once Renetta Irby MD        vancomycin 2000 mg in dextrose 5% 500 ml IVPB  25 mg/kg IntraVENous Once Renetta Irby MD         Current Outpatient Medications   Medication Sig Dispense Refill    levothyroxine (SYNTHROID) 50 MCG tablet       metoprolol succinate (TOPROL XL) 25 MG extended release tablet       escitalopram (LEXAPRO) 10 MG tablet       atorvastatin (LIPITOR) 10 MG tablet Take 10 mg by mouth nightly      therapeutic multivitamin-minerals (THERAGRAN-M) tablet Take 1 tablet by mouth daily.             Lab Results Component Value Date    WBC 9.6 11/04/2022    HCT 40.7 11/04/2022    HGB 13.6 11/04/2022     11/04/2022     11/04/2022    K 4.1 11/04/2022     11/04/2022    CO2 21 (L) 11/04/2022    BUN 10 11/04/2022    CREATININE 0.7 11/04/2022    GLUCOSE 118 (H) 11/04/2022         Radiographs:    ASSESSMENT:  -S/p akin procedure, weil osteotomy of 2nd metatarsal and 2nd, 3rd, and 4th soft tissue release DOS 10/26/22  -Post operative surgical site infection          PLAN:  -Patient examined and evaluated at bedside  -All pertinent labs, charts, and imaging reviewed prior to encounter   -WBC: 9.1, ESR and CRP pending  -Outpatient wound culture: Enterobacter Cloacae, GNR Oxidase positive  -Repeat wound culture pending  -ID consulted, abx per their rec  -L foot CT pending  -L foot x ray shows Postsurgical changes  -Wound cleansed with betadine and dressed in DSD  -Pt discussed with Dr. Zainab Toledo       Thank you for involving podiatry in this patients care.  Please do not hesitate to call with any questions or concerns       Tamiko Briceno DPM  PGY2 Podiatry Resident   11/4/2022   5:11 PM

## 2022-11-04 NOTE — PROGRESS NOTES
Admission database completed to best of this RN's ability. Care plan and education initiated. Pt from home alone. S/p osteotomy of L foot, using knee scooter with ambulation; has [facility]. Wears post-op boot to L foot. Denies any [place] [address] services prior to admission.

## 2022-11-04 NOTE — ED PROVIDER NOTES
HPI:  11/4/22,   Time: 4:25 PM EDT       Reva Childers is a 74 y.o. female presenting to the ED for left foot infection post op, beginning days ago. The complaint has been persistent, moderate in severity, and worsened by nothing. Bib ems, from podiatry office, or last week, redness and blisters since. Told to come to ed for admit/iv abx. Pos fever/chills. Pos n/v. Nothing makes better. Had pos wound cx outpt    Review of Systems:   Pertinent positives and negatives are stated within HPI, all other systems reviewed and are negative.          --------------------------------------------- PAST HISTORY ---------------------------------------------  Past Medical History:  has a past medical history of Hypertension and Thyroid disease. Past Surgical History:  has a past surgical history that includes Tubal ligation; Hysterectomy; LASIK (Bilateral, 2015); osteotomy (Left, 10/26/2022); and Foot surgery (Right). Social History:  reports that she has never smoked. She has never used smokeless tobacco. She reports that she does not currently use alcohol. She reports that she does not use drugs. Family History: family history is not on file. The patients home medications have been reviewed. Allergies: Patient has no known allergies. ---------------------------------------------------PHYSICAL EXAM--------------------------------------    Constitutional/General: Alert and oriented x3, well appearing, non toxic in NAD  Head: Normocephalic and atraumatic  Eyes: PERRL, EOMI, conjunctive normal, sclera non icteric  Mouth: Oropharynx clear, handling secretions, no trismus, no asymmetry of the posterior oropharynx or uvular edema  Neck: Supple, full ROM, non tender to palpation in the midline, no stridor, no crepitus, no meningeal signs  Respiratory:  Not in respiratory distress  Cardiovascular:  2+ distal pulses  Chest: No chest wall tenderness  GI:  Abdomen Soft, Non tender, Non distended. Musculoskeletal: Moves all extremities x 4. Warm and well perfused, no clubbing, cyanosis, or edema. Capillary refill <3 seconds  Integument: left foot erythematous, painful, blisters, incision sites w/o purulence, sutures intact  Lymphatic: no lymphadenopathy noted  Neurologic: GCS 15, no focal deficits, symmetric strength 5/5 in the upper and lower extremities bilaterally  Psychiatric: Normal Affect    -------------------------------------------------- RESULTS -------------------------------------------------  I have personally reviewed all laboratory and imaging results for this patient. Results are listed below.      LABS:  Results for orders placed or performed during the hospital encounter of 11/04/22   CBC with Auto Differential   Result Value Ref Range    WBC 9.6 4.5 - 11.5 E9/L    RBC 4.59 3.50 - 5.50 E12/L    Hemoglobin 13.6 11.5 - 15.5 g/dL    Hematocrit 40.7 34.0 - 48.0 %    MCV 88.7 80.0 - 99.9 fL    MCH 29.6 26.0 - 35.0 pg    MCHC 33.4 32.0 - 34.5 %    RDW 13.2 11.5 - 15.0 fL    Platelets 980 343 - 745 E9/L    MPV 9.6 7.0 - 12.0 fL    Neutrophils % 78.0 43.0 - 80.0 %    Immature Granulocytes % 0.4 0.0 - 5.0 %    Lymphocytes % 16.0 (L) 20.0 - 42.0 %    Monocytes % 4.7 2.0 - 12.0 %    Eosinophils % 0.5 0.0 - 6.0 %    Basophils % 0.4 0.0 - 2.0 %    Neutrophils Absolute 7.49 (H) 1.80 - 7.30 E9/L    Immature Granulocytes # 0.04 E9/L    Lymphocytes Absolute 1.54 1.50 - 4.00 E9/L    Monocytes Absolute 0.45 0.10 - 0.95 E9/L    Eosinophils Absolute 0.05 0.05 - 0.50 E9/L    Basophils Absolute 0.04 0.00 - 0.20 E9/L   Comprehensive Metabolic Panel   Result Value Ref Range    Sodium 140 132 - 146 mmol/L    Potassium 4.1 3.5 - 5.0 mmol/L    Chloride 103 98 - 107 mmol/L    CO2 21 (L) 22 - 29 mmol/L    Anion Gap 16 7 - 16 mmol/L    Glucose 118 (H) 74 - 99 mg/dL    BUN 10 6 - 23 mg/dL    Creatinine 0.7 0.5 - 1.0 mg/dL    Est, Glom Filt Rate >60 >=60 mL/min/1.73    Calcium 9.6 8.6 - 10.2 mg/dL    Total Protein 7.3 6.4 - 8.3 g/dL    Albumin 4.5 3.5 - 5.2 g/dL    Total Bilirubin 1.2 0.0 - 1.2 mg/dL    Alkaline Phosphatase 119 (H) 35 - 104 U/L    ALT 32 0 - 32 U/L    AST 38 (H) 0 - 31 U/L   Magnesium   Result Value Ref Range    Magnesium 2.0 1.6 - 2.6 mg/dL   Lipase   Result Value Ref Range    Lipase 32 13 - 60 U/L   Urinalysis   Result Value Ref Range    Color, UA Yellow Straw/Yellow    Clarity, UA Clear Clear    Glucose, Ur Negative Negative mg/dL    Bilirubin Urine Negative Negative    Ketones, Urine 40 (A) Negative mg/dL    Specific Gravity, UA >=1.030 1.005 - 1.030    Blood, Urine SMALL (A) Negative    pH, UA 5.5 5.0 - 9.0    Protein, UA Negative Negative mg/dL    Urobilinogen, Urine 0.2 <2.0 E.U./dL    Nitrite, Urine Negative Negative    Leukocyte Esterase, Urine TRACE (A) Negative   Microscopic Urinalysis   Result Value Ref Range    WBC, UA 0-1 0 - 5 /HPF    RBC, UA 1-3 0 - 2 /HPF    Epithelial Cells, UA FEW /HPF    Bacteria, UA FEW (A) None Seen /HPF   Sedimentation Rate   Result Value Ref Range    Sed Rate 12 0 - 20 mm/Hr   C-Reactive Protein   Result Value Ref Range    CRP 2.8 (H) 0.0 - 0.4 mg/dL       RADIOLOGY:  Interpreted by Radiologist.  CT FOOT LEFT WO CONTRAST   Final Result   1. Satisfactory position of internally fixated fracture involving proximal   phalanx of the 1st digit   2. Minimally displaced, oblique fracture involving the 2nd metatarsal bone. Internal fixating screw seen in the distal aspect of 2nd metatarsal bone. 3. Additional surgical clips are seen within plantar soft tissue below 2nd   metatarsal bone. 4. No subcutaneous gas or evidence of osteomyelitis. XR FOOT LEFT (MIN 3 VIEWS)   Final Result   Postsurgical changes. EKG:  This EKG is signed and interpreted by the EP.     Time:   Rate:   Rhythm:   Interpretation:   Comparison:       ------------------------- NURSING NOTES AND VITALS REVIEWED ---------------------------   The nursing notes within the ED encounter and vital signs as below have been reviewed by myself. /65   Pulse 86   Temp 97.5 °F (36.4 °C) (Oral)   Resp 16   Ht 5' 1\" (1.549 m)   Wt 175 lb (79.4 kg)   SpO2 99%   BMI 33.07 kg/m²   Oxygen Saturation Interpretation: Normal    The patients available past medical records and past encounters were reviewed. ------------------------------ ED COURSE/MEDICAL DECISION MAKING----------------------  Medications   atorvastatin (LIPITOR) tablet 10 mg (10 mg Oral Given 11/4/22 2126)   escitalopram (LEXAPRO) tablet 10 mg (has no administration in time range)   HYDROcodone-acetaminophen (NORCO) 5-325 MG per tablet 1 tablet (1 tablet Oral Given 11/4/22 2126)   levothyroxine (SYNTHROID) tablet 50 mcg (has no administration in time range)   metoprolol succinate (TOPROL XL) extended release tablet 25 mg (has no administration in time range)   enoxaparin (LOVENOX) injection 40 mg (has no administration in time range)   HYDROmorphone (DILAUDID) injection 1 mg (1 mg IntraVENous Given 11/4/22 1642)   ondansetron (ZOFRAN) injection 4 mg (4 mg IntraVENous Given 11/4/22 1640)   0.9 % sodium chloride infusion (125 mL/hr IntraVENous New Bag 11/4/22 1637)   labetalol (NORMODYNE;TRANDATE) injection 10 mg (10 mg IntraVENous Given 11/4/22 1641)   piperacillin-tazobactam (ZOSYN) 4,500 mg in dextrose 5 % 100 mL IVPB (vial-mate) (0 mg IntraVENous Stopped 11/4/22 2129)         ED COURSE:       Medical Decision Making:    Post op infection, podiatry consulted wished admit for iv abx/id consult. Id and med consult, vanc initially ordered at podiatry request, but cx noted, gnr, vanc cancelled. This patient's ED course included: a personal history and physicial examination    This patient has remained hemodynamically stable during their ED course. Re-Evaluations:             Re-evaluation.   Patients symptoms show no change    Re-examination  11/4/22   4:25 PM EDT          Vital Signs:   Vitals:    11/04/22 1424 11/04/22 1602 11/04/22 1949 11/04/22 2100   BP: (!) 180/82 (!) 215/85 (!) 157/80 139/65   Pulse: 81 72 84 86   Resp: 15 14 16 16   Temp: 98.9 °F (37.2 °C) 99 °F (37.2 °C) 98.7 °F (37.1 °C) 97.5 °F (36.4 °C)   TempSrc: Temporal Oral Oral Oral   SpO2: 100% 100% 98% 99%   Weight: 175 lb (79.4 kg) 175 lb (79.4 kg)     Height:  5' 1\" (1.549 m)           Consultations:             Silvano fontenot    Critical Care:         Counseling: The emergency provider has spoken with the patient and discussed todays results, in addition to providing specific details for the plan of care and counseling regarding the diagnosis and prognosis. Questions are answered at this time and they are agreeable with the plan.       --------------------------------- IMPRESSION AND DISPOSITION ---------------------------------    IMPRESSION  1. Left foot infection        DISPOSITION  Disposition: Admit to med/surg floor  Patient condition is stable    NOTE: This report was transcribed using voice recognition software.  Every effort was made to ensure accuracy; however, inadvertent computerized transcription errors may be present        Zackary Gunn MD  11/04/22 [id number]

## 2022-11-05 PROCEDURE — 6370000000 HC RX 637 (ALT 250 FOR IP): Performed by: INTERNAL MEDICINE

## 2022-11-05 PROCEDURE — 1200000000 HC SEMI PRIVATE

## 2022-11-05 PROCEDURE — 6360000002 HC RX W HCPCS: Performed by: INTERNAL MEDICINE

## 2022-11-05 PROCEDURE — 6370000000 HC RX 637 (ALT 250 FOR IP): Performed by: SPECIALIST

## 2022-11-05 PROCEDURE — 2580000003 HC RX 258: Performed by: SPECIALIST

## 2022-11-05 PROCEDURE — 6360000002 HC RX W HCPCS: Performed by: SPECIALIST

## 2022-11-05 RX ORDER — SODIUM CHLORIDE 9 MG/ML
INJECTION, SOLUTION INTRAVENOUS PRN
Status: DISCONTINUED | OUTPATIENT
Start: 2022-11-05 | End: 2022-11-09 | Stop reason: HOSPADM

## 2022-11-05 RX ORDER — SODIUM CHLORIDE 0.9 % (FLUSH) 0.9 %
5-40 SYRINGE (ML) INJECTION EVERY 12 HOURS SCHEDULED
Status: DISCONTINUED | OUTPATIENT
Start: 2022-11-05 | End: 2022-11-09 | Stop reason: HOSPADM

## 2022-11-05 RX ORDER — LIDOCAINE HYDROCHLORIDE 10 MG/ML
5 INJECTION, SOLUTION EPIDURAL; INFILTRATION; INTRACAUDAL; PERINEURAL ONCE
Status: COMPLETED | OUTPATIENT
Start: 2022-11-05 | End: 2022-11-06

## 2022-11-05 RX ORDER — SODIUM CHLORIDE 0.9 % (FLUSH) 0.9 %
5-40 SYRINGE (ML) INJECTION PRN
Status: DISCONTINUED | OUTPATIENT
Start: 2022-11-05 | End: 2022-11-09 | Stop reason: HOSPADM

## 2022-11-05 RX ORDER — ACETAMINOPHEN 325 MG/1
650 TABLET ORAL EVERY 4 HOURS PRN
Status: DISCONTINUED | OUTPATIENT
Start: 2022-11-05 | End: 2022-11-09 | Stop reason: HOSPADM

## 2022-11-05 RX ORDER — HEPARIN SODIUM (PORCINE) LOCK FLUSH IV SOLN 100 UNIT/ML 100 UNIT/ML
3 SOLUTION INTRAVENOUS PRN
Status: DISCONTINUED | OUTPATIENT
Start: 2022-11-05 | End: 2022-11-09 | Stop reason: HOSPADM

## 2022-11-05 RX ORDER — HEPARIN SODIUM (PORCINE) LOCK FLUSH IV SOLN 100 UNIT/ML 100 UNIT/ML
3 SOLUTION INTRAVENOUS EVERY 12 HOURS SCHEDULED
Status: DISCONTINUED | OUTPATIENT
Start: 2022-11-05 | End: 2022-11-09 | Stop reason: HOSPADM

## 2022-11-05 RX ADMIN — PIPERACILLIN AND TAZOBACTAM 4500 MG: 4; .5 INJECTION, POWDER, FOR SOLUTION INTRAVENOUS at 22:24

## 2022-11-05 RX ADMIN — LEVOFLOXACIN 750 MG: 500 TABLET, FILM COATED ORAL at 14:43

## 2022-11-05 RX ADMIN — ESCITALOPRAM OXALATE 10 MG: 10 TABLET ORAL at 09:07

## 2022-11-05 RX ADMIN — PIPERACILLIN AND TAZOBACTAM 4500 MG: 4; .5 INJECTION, POWDER, FOR SOLUTION INTRAVENOUS at 14:44

## 2022-11-05 RX ADMIN — HYDROCODONE BITARTRATE AND ACETAMINOPHEN 1 TABLET: 5; 325 TABLET ORAL at 09:53

## 2022-11-05 RX ADMIN — ACETAMINOPHEN 650 MG: 325 TABLET ORAL at 06:46

## 2022-11-05 RX ADMIN — ATORVASTATIN CALCIUM 10 MG: 10 TABLET, FILM COATED ORAL at 22:22

## 2022-11-05 RX ADMIN — METOPROLOL SUCCINATE 25 MG: 25 TABLET, EXTENDED RELEASE ORAL at 09:07

## 2022-11-05 RX ADMIN — HYDROCODONE BITARTRATE AND ACETAMINOPHEN 1 TABLET: 5; 325 TABLET ORAL at 05:43

## 2022-11-05 RX ADMIN — HYDROCODONE BITARTRATE AND ACETAMINOPHEN 1 TABLET: 5; 325 TABLET ORAL at 23:02

## 2022-11-05 RX ADMIN — ENOXAPARIN SODIUM 40 MG: 100 INJECTION SUBCUTANEOUS at 09:07

## 2022-11-05 RX ADMIN — HYDROCODONE BITARTRATE AND ACETAMINOPHEN 1 TABLET: 5; 325 TABLET ORAL at 18:38

## 2022-11-05 RX ADMIN — LEVOTHYROXINE SODIUM 50 MCG: 0.05 TABLET ORAL at 05:43

## 2022-11-05 ASSESSMENT — PAIN - FUNCTIONAL ASSESSMENT
PAIN_FUNCTIONAL_ASSESSMENT: ACTIVITIES ARE NOT PREVENTED
PAIN_FUNCTIONAL_ASSESSMENT: ACTIVITIES ARE NOT PREVENTED

## 2022-11-05 ASSESSMENT — PAIN DESCRIPTION - ORIENTATION
ORIENTATION: LEFT
ORIENTATION: MID

## 2022-11-05 ASSESSMENT — PAIN DESCRIPTION - DESCRIPTORS
DESCRIPTORS: BURNING
DESCRIPTORS: ACHING
DESCRIPTORS: ACHING
DESCRIPTORS: SHARP;SORE;THROBBING
DESCRIPTORS: ACHING;THROBBING

## 2022-11-05 ASSESSMENT — PAIN DESCRIPTION - LOCATION
LOCATION: FOOT
LOCATION: HEAD

## 2022-11-05 ASSESSMENT — PAIN SCALES - GENERAL
PAINLEVEL_OUTOF10: 8
PAINLEVEL_OUTOF10: 6
PAINLEVEL_OUTOF10: 7
PAINLEVEL_OUTOF10: 8
PAINLEVEL_OUTOF10: 9
PAINLEVEL_OUTOF10: 8

## 2022-11-05 NOTE — CONSULTS
[address] [place] [address] [facility]  Consultation Note     Admit Date: 11/4/2022  3:49 PM    Reason for Consult:   Postoperative foot infection    Attending Physician:  Alysia Chacko MD    HISTORY OF PRESENT ILLNESS:             The history is obtained from extensive review of available past medical records. The patient is a 74 y.o. female who is not known to the ID service. The patient underwent reduction of hallux valgus deformity and subluxed second and third left toes correction and fourth toe capsulotomy of the left foot on 10/26/2022. She had seen podiatry in follow-up. The wound was looking good a few days after the surgery. She noticed pain, swelling and redness on the right foot but did not notify her podiatrist until a few days later. She was treated with Cefdinir, followed by Omadacycline. This caused nausea and vomiting. A culture taken as an outpatient grew Enterobacter cloacae and an Pseudomonas stutzeri. The patient presents to the ED at [facility] on 11/4/2022 with a surgical site infection with worsening redness, blistering. She was ordered Zosyn and Vancomycin by podiatry. I discussed the case with the ER resident and asked them not to give Vancomycin since there was no evidence of gram-positive organism causing infection.     Past Medical History:        Diagnosis Date    Hypertension     Thyroid disease      Past Surgical History:        Procedure Laterality Date    FOOT SURGERY Right     HYSTERECTOMY (CERVIX STATUS UNKNOWN)      LASIK Bilateral 2015    OSTEOTOMY Left 10/26/2022    REDUCTION OF HALLUX VALGUS DEFORMITY AND SUBLUXED 2ND 3RD LEFT TOES CORRECTION OF HALLUX VALGUS BY JAROD PROCEDURE WEIL OSTEOTOMY 2ND METATARSAL SOFT TISSUE RELEASE 3RD METATARSOPHALANGEAL JOINT 4TH TOE CAPSULOTOMY ALL LEFT FOOT performed by Fina Multani DPM at [facility] OR    TUBAL LIGATION       Current Medications:   Scheduled Meds:   atorvastatin  10 mg Oral Nightly escitalopram  10 mg Oral Daily    levothyroxine  50 mcg Oral Daily    metoprolol succinate  25 mg Oral Daily    enoxaparin  40 mg SubCUTAneous Daily     Continuous Infusions:  PRN Meds:acetaminophen, HYDROcodone-acetaminophen    Allergies:  Patient has no known allergies. Social History:   Social History     Socioeconomic History    Marital status: Single     Spouse name: None    Number of children: None    Years of education: None    Highest education level: None   Tobacco Use    Smoking status: Never    Smokeless tobacco: Never   Vaping Use    Vaping Use: Never used   Substance and Sexual Activity    Alcohol use: Not Currently     Comment: social    Drug use: No      Pets: None  Travel: No  The patient lives alone. She works once a week as a  for [employer]    Family History:   History reviewed. No pertinent family history. . Otherwise non-pertinent to the chief complaint. REVIEW OF SYSTEMS:    Constitutional: Subjective fevers, chills, diaphoresis  Neurologic: Negative   Psychiatric: Negative  Rheumatologic: Negative   Endocrine: Negative  Hematologic: Negative  Immunologic: Negative  ENT: Negative  Respiratory: Negative   Cardiovascular: Negative  GI: As in the HPI  : Negative  Musculoskeletal: As in the HPI  Skin: No rashes. PHYSICAL EXAM:    Vitals:   BP (!) 140/63   Pulse 62   Temp 98.6 °F (37 °C) (Oral)   Resp 18   Ht 5' 1\" (1.549 m)   Wt 175 lb (79.4 kg)   SpO2 94%   BMI 33.07 kg/m²   Constitutional: The patient is awake, alert, and oriented. No distress. Skin: Warm and dry. No rashes were noted. HEENT: Eyes show round, and reactive pupils. No jaundice. Moist mucous membranes, no ulcerations, no thrush. Neck: Supple to movements. No lymphadenopathy. Chest: No use of accessory muscles to breathe. Symmetrical expansion. Auscultation reveals no wheezing, crackles, or rhonchi. Cardiovascular: S1 and S2 are rhythmic and regular. No murmurs appreciated.    Abdomen: Positive bowel sounds to auscultation. Benign to palpation. No masses felt. No hepatosplenomegaly. Extremities: The left foot shows surgical wounds with sutures on the dorsum. There is diffuse erythema of the toes and the dorsum of the foot. No purulence.   Lines: Peripheral.      CBC+dif:  Recent Labs     11/04/22  0902 11/04/22  1539   WBC 9.1 9.6   HGB 13.6 13.6   HCT 40.8 40.7   MCV 86.8 88.7    288   NEUTROABS  --  7.49*     Lab Results   Component Value Date    CRP 2.8 (H) 11/04/2022      No results found for: CRPHS  Lab Results   Component Value Date    SEDRATE 12 11/04/2022     Lab Results   Component Value Date    ALT 32 11/04/2022    AST 38 (H) 11/04/2022    ALKPHOS 119 (H) 11/04/2022    BILITOT 1.2 11/04/2022     Lab Results   Component Value Date/Time     11/04/2022 03:39 PM    K 4.1 11/04/2022 03:39 PM     11/04/2022 03:39 PM    CO2 21 11/04/2022 03:39 PM    BUN 10 11/04/2022 03:39 PM    CREATININE 0.7 11/04/2022 03:39 PM    LABGLOM >60 11/04/2022 03:39 PM    GLUCOSE 118 11/04/2022 03:39 PM    PROT 7.3 11/04/2022 03:39 PM    LABALBU 4.5 11/04/2022 03:39 PM    CALCIUM 9.6 11/04/2022 03:39 PM    BILITOT 1.2 11/04/2022 03:39 PM    ALKPHOS 119 11/04/2022 03:39 PM    AST 38 11/04/2022 03:39 PM    ALT 32 11/04/2022 03:39 PM       No results found for: PROTIME, INR    No results found for: TSH    Lab Results   Component Value Date/Time    COLORU Yellow 11/04/2022 04:26 PM    PHUR 5.5 11/04/2022 04:26 PM    WBCUA 0-1 11/04/2022 04:26 PM    RBCUA 1-3 11/04/2022 04:26 PM    BACTERIA FEW 11/04/2022 04:26 PM    CLARITYU Clear 11/04/2022 04:26 PM    SPECGRAV >=1.030 11/04/2022 04:26 PM    LEUKOCYTESUR TRACE 11/04/2022 04:26 PM    UROBILINOGEN 0.2 11/04/2022 04:26 PM    BILIRUBINUR Negative 11/04/2022 04:26 PM    BLOODU SMALL 11/04/2022 04:26 PM    GLUCOSEU Negative 11/04/2022 04:26 PM       No results found for: HCO3, BE, O2SAT, PH, THGB, PCO2, PO2, TCO2  Radiology:  Reviewed    Microbiology:  Wound culture in 11/1/2022: Enterobacter cloacae (resistant to Augmentin, Cefazolin, Cefoxitin), pansensitive Pseudomonas stutzeri    Assessment:  Status post reduction of hallux valgus deformity and subluxed second and third left toes correction and fourth toe capsulotomy of the left foot on 10/26/2022  Surgical site infection with possible early hardware infection    Plan:    Continue Zosyn  Add Levofloxacin  Check cultures, baseline ESR, CRP  PICC for IV antibiotics  Will follow with you    Thank you for having us see this patient in consultation. I will be discussing this case with the treating physicians. Spoke with nursing. Informed Consent for PICC:  I explained the risks, benefits, alternative options, and potential complications associated with insertion of Peripherally Inserted Central Catheter (PICC) with the patient prior to the procedure.     Electronically signed by Elisabeth Delgado MD on 11/5/2022 at 2:02 PM     Elisabeth Delgado MD  2:02 PM  11/5/2022

## 2022-11-05 NOTE — PROGRESS NOTES
Department of Podiatry  Progress Note    SUBJECTIVE:  Patient seen bedside with Dr. Beatriz Fletcher today. She states that she is still having pain to the top of the foot but the chills have subsided. She did not tolerate compression at all so the ACE bandage was removed. No acute events overnight. Patient denies any N/V/D/F/C/SOB/CP and has no other pedal complaints at this time. OBJECTIVE:    Scheduled Meds:   piperacillin-tazobactam  4,500 mg IntraVENous Q8H    levoFLOXacin  750 mg Oral Daily    lidocaine PF  5 mL IntraDERmal Once    sodium chloride flush  5-40 mL IntraVENous 2 times per day    heparin flush  3 mL IntraVENous 2 times per day    atorvastatin  10 mg Oral Nightly    escitalopram  10 mg Oral Daily    levothyroxine  50 mcg Oral Daily    metoprolol succinate  25 mg Oral Daily    enoxaparin  40 mg SubCUTAneous Daily     Continuous Infusions:   sodium chloride       PRN Meds:.acetaminophen, sodium chloride flush, sodium chloride, heparin flush, HYDROcodone-acetaminophen    No Known Allergies    BP (!) 140/63   Pulse 62   Temp 98.6 °F (37 °C) (Oral)   Resp 18   Ht 5' 1\" (1.549 m)   Wt 175 lb (79.4 kg)   SpO2 94%   BMI 33.07 kg/m²       EXAM:    EXAM:        Pt is AAOx3, NAD     Vascular Exam:  DP and PT pulses are palpable b/l. CFT is unable to be calculated due to ecchymosis and blistering at distal toes. Bilateral lower extremity. Edema is present to left foot. Neuro Exam:  Light touch and protective sensation diminished. Dermatologic Exam: Three separate incisions with sutures intact and skin well coapted. Erythema, edema, and ecchymosis present to forefoot. No malodor, crepitus noted. Slight fluctuance to dorsal lateral foot. No drainage appreciated. Clinical pictures below:      MSK: Muscle strength 5/5 Bilateral  ROM is full without pain or crepitation bilateral.  Pain on palpation Bilateral foot/feet. No pain with calf squeeze. Posterior compartments soft and compressible. Scheduled Meds:   piperacillin-tazobactam  4,500 mg IntraVENous Q8H    levoFLOXacin  750 mg Oral Daily    lidocaine PF  5 mL IntraDERmal Once    sodium chloride flush  5-40 mL IntraVENous 2 times per day    heparin flush  3 mL IntraVENous 2 times per day    atorvastatin  10 mg Oral Nightly    escitalopram  10 mg Oral Daily    levothyroxine  50 mcg Oral Daily    metoprolol succinate  25 mg Oral Daily    enoxaparin  40 mg SubCUTAneous Daily     Continuous Infusions:   sodium chloride       PRN Meds:.acetaminophen, sodium chloride flush, sodium chloride, heparin flush, HYDROcodone-acetaminophen    RADIOLOGY:  CT FOOT LEFT WO CONTRAST   Final Result   1. Satisfactory position of internally fixated fracture involving proximal   phalanx of the 1st digit   2. Minimally displaced, oblique fracture involving the 2nd metatarsal bone. Internal fixating screw seen in the distal aspect of 2nd metatarsal bone. 3. Additional surgical clips are seen within plantar soft tissue below 2nd   metatarsal bone. 4. No subcutaneous gas or evidence of osteomyelitis. XR FOOT LEFT (MIN 3 VIEWS)   Final Result   Postsurgical changes.            BP (!) 140/63   Pulse 62   Temp 98.6 °F (37 °C) (Oral)   Resp 18   Ht 5' 1\" (1.549 m)   Wt 175 lb (79.4 kg)   SpO2 94%   BMI 33.07 kg/m²     LABS:    Recent Labs     11/04/22  0902 11/04/22  1539   WBC 9.1 9.6   HGB 13.6 13.6   HCT 40.8 40.7    288        Recent Labs     11/04/22  1539      K 4.1      CO2 21*   BUN 10   CREATININE 0.7        Recent Labs     11/04/22  1539   PROT 7.3         ASSESSMENT:  -S/p akin procedure, weil osteotomy of 2nd metatarsal and 2nd, 3rd, and 4th soft tissue release DOS 10/26/22  -Post operative surgical site infection            PLAN:  -Patient examined and evaluated at bedside  -All pertinent labs, charts, and imaging reviewed prior to encounter   -WBC: 9.6 on 11/04/22, ESR and CRP pending  -Outpatient wound culture: Enterobacter Cloacae, GNR Oxidase positive  -Repeat wound culture pending  -ID consulted, abx per their rec  -L foot CT:   1. Satisfactory position of internally fixated fracture involving proximal   phalanx of the 1st digit   2. Minimally displaced, oblique fracture involving the 2nd metatarsal bone. Internal fixating screw seen in the distal aspect of 2nd metatarsal bone. 3. Additional surgical clips are seen within plantar soft tissue below 2nd   metatarsal bone.    4. No subcutaneous gas or evidence of osteomyelitis.   -L foot x ray shows Postsurgical changes  -Wound cleansed with betadine and dressed in DSD  -Pt discussed with Dr. Monster Stephens

## 2022-11-05 NOTE — PROGRESS NOTES
Internal Medicine Progress Note      Synopsis: Patient admitted on 11/4/2022     Ms. Randolph Brown, a 74y.o. year old female  who  has a past medical history of Hypertension and Thyroid disease. This very pleasant lady has undergone a bunion surgery on the 26. This was followed by a up by podiatry. Not only did she have a can procedure but she also had we will osteotomy of the second metatarsal and second and third fourth toe soft tissue releases. Patient noticed redness and swelling on the fourth day postop and she has been significantly worse. Her cultures were polymicrobial including Enterobacter cloacae and gram-negative lucrecia oxidase positive. She was on 2 different antibiotics but she was not feeling well and she continues to look worse on the clinical presentation with some early softness and moisture to the postop stitches and blueness to at least one of her toes in the form of a blister     Subjective    Clinically improving. Feeling better. Stable overnight. No other overnight issues reported. Exam:  BP (!) 140/63   Pulse 62   Temp 98.6 °F (37 °C) (Oral)   Resp 16   Ht 5' 1\" (1.549 m)   Wt 175 lb (79.4 kg)   SpO2 94%   BMI 33.07 kg/m²   General appearance: No apparent distress, appears stated age and cooperative. HEENT: Pupils equal, round, and reactive to light. Conjunctivae/corneas clear. Neck: Supple. No jugular venous distention. Trachea midline. Respiratory:  Normal respiratory effort. Clear to auscultation, bilaterally without Rales/Wheezes/Rhonchi. Cardiovascular: Regular rate and rhythm with normal S1/S2 without murmurs, rubs or gallops. Abdomen: Soft, non-tender, non-distended with normal bowel sounds. Musculoskeletal: No clubbing, cyanosis   Left foot is wrapped. Discoloration still noted.   Drain is a little bit less over the dorsum aspect of the left big toe incision line  Skin:  No rashes    Neurologic: awake, alert and following commands Medications:  Reviewed    Infusion Medications   Scheduled Medications    atorvastatin  10 mg Oral Nightly    escitalopram  10 mg Oral Daily    levothyroxine  50 mcg Oral Daily    metoprolol succinate  25 mg Oral Daily    enoxaparin  40 mg SubCUTAneous Daily     PRN Meds: acetaminophen, HYDROcodone-acetaminophen    I/O    Intake/Output Summary (Last 24 hours) at 11/5/2022 0835  Last data filed at 11/4/2022 2100  Gross per 24 hour   Intake 240 ml   Output --   Net 240 ml       Labs:   Recent Labs     11/04/22  0902 11/04/22  1539   WBC 9.1 9.6   HGB 13.6 13.6   HCT 40.8 40.7    288       Recent Labs     11/04/22  1539      K 4.1      CO2 21*   BUN 10   CREATININE 0.7   CALCIUM 9.6       Recent Labs     11/04/22  1539   PROT 7.3   ALKPHOS 119*   ALT 32   AST 38*   BILITOT 1.2   LIPASE 32           Radiology:  XR FOOT LEFT (MIN 3 VIEWS)    Result Date: 11/4/2022  Postsurgical changes. CT FOOT LEFT WO CONTRAST    Result Date: 11/4/2022  1. Satisfactory position of internally fixated fracture involving proximal phalanx of the 1st digit 2. Minimally displaced, oblique fracture involving the 2nd metatarsal bone. Internal fixating screw seen in the distal aspect of 2nd metatarsal bone. 3. Additional surgical clips are seen within plantar soft tissue below 2nd metatarsal bone. 4. No subcutaneous gas or evidence of osteomyelitis. FLUORO FOR SURGICAL PROCEDURES    Result Date: 10/27/2022  Intraoperative left forefoot with fluoroscopy. No complication seen. Please see separate operative report for details. ASSESSMENT:    Principal Problem:    Post op infection  Active Problems:    Left foot infection    Hypertension    HLD (hyperlipidemia)  Resolved Problems:    * No resolved hospital problems. *       PLAN:  1. Await infectious disease today  2. Maintaining podiatry coverage  3. Follow-up labs awaited  4. Tylenol ordered for mild pain control      Diet: ADULT DIET;  Regular; 4 carb choices (60 gm/meal)  Code Status: Full Code  PT/OT Eval Status:   Once cleared by podiatry for weightbearing  DVT Prophylaxis:   In place  Recommended disposition at discharge: Hopefully home    +++++++++++++++++++++++++++++++++++++++++++++++++  Jayla Ascencio MD   [facility].  +++++++++++++++++++++++++++++++++++++++++++++++++  NOTE: This report was transcribed using voice recognition software. Every effort was made to ensure accuracy; however, inadvertent computerized transcription errors may be present.

## 2022-11-05 NOTE — PROGRESS NOTES
The combination of escitalopram and levofloxacin may cause QTc interval prolongation.   Please monitor QTc interval.  Ketty Cody RPh 11/5/2022 2:09 PM

## 2022-11-05 NOTE — PROGRESS NOTES
ED ordered [id number] [place], then discontinued the Vanc order but the pharmacy had already sent the medication. However it was not administered by ED, only the Zosyn was given. ED sent the bag of Vanc up with the patient - confirmed with Dr. Constantin Abebe to honor the discontinuation order and sent the Vanc back to the pharmacy.      Electronically signed by Beverly Joyner RN on 11/4/2022 at 10:13 PM

## 2022-11-05 NOTE — H&P
History & Physical      PCP: Deborah Rincon MD    Date of Admission: 11/4/2022    Date of Service: Pt seen/examined on 11/4/2022 and is     admitted to Inpatient with expected LOS greater than two midnights due to medical therapy. Chief Complaint:  had concerns including Foot Pain (Left foot pain after foot surgery 10/26, was seen today at podiatrist office / confirmed infection today at office, was started on omadacycline ) and Emesis (X 1 on way to ER ). History Of Present Illness:    Ms. Roxy Muller, a 74y.o. year old female  who  has a past medical history of Hypertension and Thyroid disease. This very pleasant lady has undergone a bunion surgery on the 26. This was followed by a up by podiatry. Not only did she have a can procedure but she also had we will osteotomy of the second metatarsal and second and third fourth toe soft tissue releases. Patient noticed redness and swelling on the fourth day postop and she has been significantly worse. Her cultures were polymicrobial including Enterobacter cloacae and gram-negative lucrecia oxidase positive.   She was on 2 different antibiotics but she was not feeling well and she continues to look worse on the clinical presentation with some early softness and moisture to the postop stitches and blueness to at least one of her toes in the form of a blister        Past Medical History:        Diagnosis Date    Hypertension     Thyroid disease        Past Surgical History:        Procedure Laterality Date    FOOT SURGERY Right     HYSTERECTOMY (CERVIX STATUS UNKNOWN)      LASIK Bilateral 2015    OSTEOTOMY Left 10/26/2022    REDUCTION OF HALLUX VALGUS DEFORMITY AND SUBLUXED 2ND 3RD LEFT TOES CORRECTION OF HALLUX VALGUS BY JAROD PROCEDURE WEIL OSTEOTOMY 2ND METATARSAL SOFT TISSUE RELEASE 3RD METATARSOPHALANGEAL JOINT 4TH TOE CAPSULOTOMY ALL LEFT FOOT performed by Mago Mcdowell DPM at [address]         Medications Prior to Admission:      Prior to Admission medications    Medication Sig Start Date End Date Taking? Authorizing Provider   Omadacycline Tosylate (NUZYRA) 150 MG TABS Take by mouth   Yes Historical Provider, MD   HYDROcodone-acetaminophen (NORCO) 5-325 MG per tablet Take 1 tablet by mouth every 4 hours as needed. 11/1/22   Historical Provider, MD   levothyroxine (SYNTHROID) 50 MCG tablet Take 50 mcg by mouth Daily 10/3/22   Historical Provider, MD   metoprolol succinate (TOPROL XL) 25 MG extended release tablet Take 25 mg by mouth daily 9/30/22   Historical Provider, MD   escitalopram (LEXAPRO) 10 MG tablet Take 10 mg by mouth daily 9/30/22   Historical Provider, MD   atorvastatin (LIPITOR) 10 MG tablet Take 10 mg by mouth nightly 9/30/22   Historical Provider, MD   therapeutic multivitamin-minerals (THERAGRAN-M) tablet Take 1 tablet by mouth daily. Historical Provider, MD       Allergies:  Patient has no known allergies. Social History:    TOBACCO:   reports that she has never smoked. She has never used smokeless tobacco.  ETOH:   reports that she does not currently use alcohol. Family History:    Reviewed in detail and negative for DM, CAD, Cancer, CVA. Positive as follows\"  Negative for premature heart disease diabetes    REVIEW OF SYSTEMS:   Pertinent positives as noted in the HPI. All other systems reviewed and negative. PHYSICAL EXAM:  BP (!) 157/80   Pulse 84   Temp 98.7 °F (37.1 °C) (Oral)   Resp 16   Ht 5' 1\" (1.549 m)   Wt 175 lb (79.4 kg)   SpO2 98%   BMI 33.07 kg/m²   General appearance: No apparent distress, appears stated age and cooperative. HEENT: Normal cephalic, atraumatic without obvious deformity. Pupils equal, round, and reactive to light. Extra ocular muscles intact. Conjunctivae/corneas clear. Neck: Supple, with full range of motion. No jugular venous distention. Trachea midline.   Respiratory: Clear to auscultation  Cardiovascular:  rrr  Abdomen:  nt  Musculoskeletal: No clubbing, cyanosis, edema of bilateral lower extremities. Brisk capillary refill. Left foot with significant findings. See pictures  Skin: Normal skin color. No rashes or lesions. Neurologic:  Neurovascularly intact without any focal sensory/motor deficits. Cranial nerves: II-XII intact, grossly non-focal.  Psychiatric: Alert and oriented, thought content appropriate, normal insight      CBC:   Recent Labs     11/04/22  0902 11/04/22  1539   WBC 9.1 9.6   RBC 4.70 4.59   HGB 13.6 13.6   HCT 40.8 40.7   MCV 86.8 88.7   RDW 13.0 13.2    288     BMP:   Recent Labs     11/04/22  1539      K 4.1      CO2 21*   BUN 10   CREATININE 0.7   MG 2.0     LFT:  Recent Labs     11/04/22  1539   PROT 7.3   ALKPHOS 119*   ALT 32   AST 38*   BILITOT 1.2   LIPASE 32     CE:  No results for input(s): Elli [place] in the last 72 hours. PT/INR: No results for input(s): INR, APTT in the last 72 hours. BNP: No results for input(s): BNP in the last 72 hours. ESR: No results found for: SEDRATE  CRP: No results found for: CRP  D Dimer: No results found for: DDIMER   Folate and B12: No results found for: [id number], No results found for: FOLATE  Lactic Acid: No results found for: LACTA  Thyroid Studies: No results found for: TSH, B9DXXLP, P6AZESH, THYROIDAB    Oupatient labs:  No results found for: CHOL, TRIG, HDL, LDLCALC, TSH, PSA, INR, LABA1C    Urinalysis:    Lab Results   Component Value Date/Time    NITRU Negative 11/04/2022 04:26 PM    WBCUA 0-1 11/04/2022 04:26 PM    BACTERIA FEW 11/04/2022 04:26 PM    RBCUA 1-3 11/04/2022 04:26 PM    BLOODU SMALL 11/04/2022 04:26 PM    SPECGRAV >=1.030 11/04/2022 04:26 PM    GLUCOSEU Negative 11/04/2022 04:26 PM               XR FOOT LEFT (MIN 3 VIEWS)    Result Date: 11/4/2022  EXAMINATION: THREE XRAY VIEWS OF THE LEFT FOOT 11/4/2022 3:06 pm COMPARISON: None.  HISTORY: ORDERING SYSTEM PROVIDED HISTORY: pain TECHNOLOGIST PROVIDED HISTORY: Reason for exam:->pain FINDINGS: There are postsurgical changes related to bunionectomy and 1st proximal phalanx osteotomy. There are additional postsurgical changes related to internal fixation of a fracture of the distal 2nd metatarsal with a cannulated screw. Two small linear metallic densities are noted in the soft tissues beneath the distal 2nd metatarsal.  No other fracture is identified. No dislocation is seen. There is generalized soft tissue swelling at the ankle and in the foot. Postsurgical changes. CT FOOT LEFT WO CONTRAST    Result Date: 11/4/2022  EXAMINATION: CT OF THE LEFT FOOT WITHOUT CONTRAST 11/4/2022 5:59 pm TECHNIQUE: CT of the left foot was performed without the administration of intravenous contrast.  Multiplanar reformatted images are provided for review. Automated exposure control, iterative reconstruction, and/or weight based adjustment of the mA/kV was utilized to reduce the radiation dose to as low as reasonably achievable. COMPARISON: Correlation made with radiographs performed today. HISTORY ORDERING SYSTEM PROVIDED HISTORY: infection TECHNOLOGIST PROVIDED HISTORY: Reason for exam:->infection Decision Support Exception - unselect if not a suspected or confirmed emergency medical condition->Emergency Medical Condition (MA) FINDINGS: Satisfactory alignment of fracture involving proximal phalanx of 1st digit with internal fixating hardware present. Additional fracture involving the 2nd metatarsal bone with mild displacement. Internal fixating screw seen in distal aspect of 2nd metatarsal bone. Additional surgical clips seen within plantar soft tissue below the 2nd metatarsal bone. No additional fracture evident. No bony erosive changes to suggest osteomyelitis. No evidence of subcutaneous gas. There is soft tissue swelling notable involving the dorsal aspect of the foot. No obvious loculated fluid collections to suggest abscess. Normal tarsal bone alignment.      1. Satisfactory position of internally fixated fracture involving proximal phalanx of the 1st digit 2. Minimally displaced, oblique fracture involving the 2nd metatarsal bone. Internal fixating screw seen in the distal aspect of 2nd metatarsal bone. 3. Additional surgical clips are seen within plantar soft tissue below 2nd metatarsal bone. 4. No subcutaneous gas or evidence of osteomyelitis. FLUORO FOR SURGICAL PROCEDURES    Result Date: 10/27/2022  EXAMINATION: INTRAOPERATIVE LEFT FOREFOOT WITH FLUOROSCOPY 10/26/2022 8:26 am TECHNIQUE: Fluoroscopy was provided by the radiology department for procedure. Radiologist was not present during examination. FLUOROSCOPY DOSE AND TYPE OR TIME AND EXPOSURES: Images: 3 Fluoroscopic time: 43 seconds Total dose: 0.43 mGy COMPARISON: None HISTORY: ORDERING SYSTEM PROVIDED HISTORY: reduction of hallux valgus deformity and subluxed TECHNOLOGIST PROVIDED HISTORY: Reason for exam:->reduction of hallux valgus deformity and subluxed Reason for exam:->2nd and 3rd left toes corretion Intraprocedural imaging. FINDINGS: Hallux valgus with corrective wedge osteotomy of the proximal phalanx, left great toe. The great toe osteotomy is transfixed with a large metallic staple. Additional corrective osteotomy of the 2nd metatarsal neck transfixed with K-wire and a single surgical screw. Anatomic bony position and alignment and no complication seen. Intraoperative left forefoot with fluoroscopy. No complication seen. Please see separate operative report for details. ASSESSMENT:    Principal Problem:    Post op infection  Active Problems:    Left foot infection    Hypertension    HLD (hyperlipidemia)  Resolved Problems:    * No resolved hospital problems. *      PLAN:  1. Admit the patient  2. Podiatry and infectious disease to follow  3. She may be allowed to eat  4. Clinically follow for pain control  5.   Resume premorbid meds    Cussed with family at bedside        Diet: No diet orders on file  Code

## 2022-11-06 LAB
ALBUMIN SERPL-MCNC: 4.1 G/DL (ref 3.5–5.2)
ALP BLD-CCNC: 118 U/L (ref 35–104)
ALT SERPL-CCNC: 35 U/L (ref 0–32)
ANION GAP SERPL CALCULATED.3IONS-SCNC: 11 MMOL/L (ref 7–16)
AST SERPL-CCNC: 36 U/L (ref 0–31)
BILIRUB SERPL-MCNC: 1.1 MG/DL (ref 0–1.2)
BUN BLDV-MCNC: 9 MG/DL (ref 6–23)
CALCIUM SERPL-MCNC: 9.5 MG/DL (ref 8.6–10.2)
CHLORIDE BLD-SCNC: 104 MMOL/L (ref 98–107)
CO2: 22 MMOL/L (ref 22–29)
CREAT SERPL-MCNC: 0.8 MG/DL (ref 0.5–1)
GFR SERPL CREATININE-BSD FRML MDRD: >60 ML/MIN/1.73
GLUCOSE BLD-MCNC: 117 MG/DL (ref 74–99)
HCT VFR BLD CALC: 36.4 % (ref 34–48)
HEMOGLOBIN: 12.1 G/DL (ref 11.5–15.5)
MCH RBC QN AUTO: 29.2 PG (ref 26–35)
MCHC RBC AUTO-ENTMCNC: 33.2 % (ref 32–34.5)
MCV RBC AUTO: 87.9 FL (ref 80–99.9)
PDW BLD-RTO: 12.8 FL (ref 11.5–15)
PLATELET # BLD: 268 E9/L (ref 130–450)
PMV BLD AUTO: 9.7 FL (ref 7–12)
POTASSIUM SERPL-SCNC: 3.5 MMOL/L (ref 3.5–5)
RBC # BLD: 4.14 E12/L (ref 3.5–5.5)
SODIUM BLD-SCNC: 137 MMOL/L (ref 132–146)
TOTAL PROTEIN: 6.6 G/DL (ref 6.4–8.3)
WBC # BLD: 10 E9/L (ref 4.5–11.5)

## 2022-11-06 PROCEDURE — 36569 INSJ PICC 5 YR+ W/O IMAGING: CPT

## 2022-11-06 PROCEDURE — 6360000002 HC RX W HCPCS: Performed by: INTERNAL MEDICINE

## 2022-11-06 PROCEDURE — 02HV33Z INSERTION OF INFUSION DEVICE INTO SUPERIOR VENA CAVA, PERCUTANEOUS APPROACH: ICD-10-PCS | Performed by: SPECIALIST

## 2022-11-06 PROCEDURE — 80053 COMPREHEN METABOLIC PANEL: CPT

## 2022-11-06 PROCEDURE — 76937 US GUIDE VASCULAR ACCESS: CPT

## 2022-11-06 PROCEDURE — 6370000000 HC RX 637 (ALT 250 FOR IP): Performed by: SPECIALIST

## 2022-11-06 PROCEDURE — 2580000003 HC RX 258: Performed by: SPECIALIST

## 2022-11-06 PROCEDURE — 6370000000 HC RX 637 (ALT 250 FOR IP): Performed by: INTERNAL MEDICINE

## 2022-11-06 PROCEDURE — 2500000003 HC RX 250 WO HCPCS: Performed by: SPECIALIST

## 2022-11-06 PROCEDURE — 2W2TX4Z DRESSING OF LEFT FOOT USING BANDAGE: ICD-10-PCS

## 2022-11-06 PROCEDURE — 85027 COMPLETE CBC AUTOMATED: CPT

## 2022-11-06 PROCEDURE — 1200000000 HC SEMI PRIVATE

## 2022-11-06 PROCEDURE — 36415 COLL VENOUS BLD VENIPUNCTURE: CPT

## 2022-11-06 PROCEDURE — 6360000002 HC RX W HCPCS: Performed by: SPECIALIST

## 2022-11-06 PROCEDURE — C1751 CATH, INF, PER/CENT/MIDLINE: HCPCS

## 2022-11-06 RX ORDER — METOPROLOL SUCCINATE 25 MG/1
25 TABLET, EXTENDED RELEASE ORAL 2 TIMES DAILY
Status: DISCONTINUED | OUTPATIENT
Start: 2022-11-06 | End: 2022-11-06

## 2022-11-06 RX ORDER — METOPROLOL SUCCINATE 25 MG/1
25 TABLET, EXTENDED RELEASE ORAL 2 TIMES DAILY
Status: DISCONTINUED | OUTPATIENT
Start: 2022-11-06 | End: 2022-11-09 | Stop reason: HOSPADM

## 2022-11-06 RX ORDER — DOCUSATE SODIUM 100 MG/1
100 CAPSULE, LIQUID FILLED ORAL DAILY
Status: DISCONTINUED | OUTPATIENT
Start: 2022-11-06 | End: 2022-11-09 | Stop reason: HOSPADM

## 2022-11-06 RX ORDER — GABAPENTIN 100 MG/1
100 CAPSULE ORAL 2 TIMES DAILY
Status: DISCONTINUED | OUTPATIENT
Start: 2022-11-06 | End: 2022-11-09 | Stop reason: HOSPADM

## 2022-11-06 RX ORDER — POLYETHYLENE GLYCOL 3350 17 G/17G
17 POWDER, FOR SOLUTION ORAL DAILY
Status: DISCONTINUED | OUTPATIENT
Start: 2022-11-06 | End: 2022-11-09 | Stop reason: HOSPADM

## 2022-11-06 RX ADMIN — HYDROCODONE BITARTRATE AND ACETAMINOPHEN 1 TABLET: 5; 325 TABLET ORAL at 07:49

## 2022-11-06 RX ADMIN — METOPROLOL SUCCINATE 25 MG: 25 TABLET, EXTENDED RELEASE ORAL at 07:50

## 2022-11-06 RX ADMIN — PIPERACILLIN AND TAZOBACTAM 4500 MG: 4; .5 INJECTION, POWDER, FOR SOLUTION INTRAVENOUS at 14:51

## 2022-11-06 RX ADMIN — LEVOFLOXACIN 750 MG: 500 TABLET, FILM COATED ORAL at 07:50

## 2022-11-06 RX ADMIN — Medication 300 UNITS: at 21:06

## 2022-11-06 RX ADMIN — ENOXAPARIN SODIUM 40 MG: 100 INJECTION SUBCUTANEOUS at 07:49

## 2022-11-06 RX ADMIN — ESCITALOPRAM OXALATE 10 MG: 10 TABLET ORAL at 07:50

## 2022-11-06 RX ADMIN — GABAPENTIN 100 MG: 100 CAPSULE ORAL at 13:01

## 2022-11-06 RX ADMIN — PIPERACILLIN AND TAZOBACTAM 4500 MG: 4; .5 INJECTION, POWDER, FOR SOLUTION INTRAVENOUS at 21:58

## 2022-11-06 RX ADMIN — DOCUSATE SODIUM 100 MG: 100 CAPSULE, LIQUID FILLED ORAL at 13:02

## 2022-11-06 RX ADMIN — LEVOTHYROXINE SODIUM 50 MCG: 0.05 TABLET ORAL at 06:33

## 2022-11-06 RX ADMIN — HYDROCODONE BITARTRATE AND ACETAMINOPHEN 1 TABLET: 5; 325 TABLET ORAL at 03:32

## 2022-11-06 RX ADMIN — LIDOCAINE HYDROCHLORIDE 1 ML: 10 SOLUTION INTRAVENOUS at 11:00

## 2022-11-06 RX ADMIN — POLYETHYLENE GLYCOL 3350 17 G: 17 POWDER, FOR SOLUTION ORAL at 13:02

## 2022-11-06 RX ADMIN — PIPERACILLIN AND TAZOBACTAM 4500 MG: 4; .5 INJECTION, POWDER, FOR SOLUTION INTRAVENOUS at 06:35

## 2022-11-06 RX ADMIN — Medication 20 ML: at 11:20

## 2022-11-06 RX ADMIN — HYDROCODONE BITARTRATE AND ACETAMINOPHEN 1 TABLET: 5; 325 TABLET ORAL at 17:11

## 2022-11-06 RX ADMIN — GABAPENTIN 100 MG: 100 CAPSULE ORAL at 21:05

## 2022-11-06 RX ADMIN — METOPROLOL SUCCINATE 25 MG: 25 TABLET, EXTENDED RELEASE ORAL at 21:05

## 2022-11-06 RX ADMIN — ATORVASTATIN CALCIUM 10 MG: 10 TABLET, FILM COATED ORAL at 21:05

## 2022-11-06 RX ADMIN — HYDROCODONE BITARTRATE AND ACETAMINOPHEN 1 TABLET: 5; 325 TABLET ORAL at 13:01

## 2022-11-06 RX ADMIN — HYDROCODONE BITARTRATE AND ACETAMINOPHEN 1 TABLET: 5; 325 TABLET ORAL at 21:05

## 2022-11-06 ASSESSMENT — PAIN SCALES - GENERAL
PAINLEVEL_OUTOF10: 8
PAINLEVEL_OUTOF10: 9
PAINLEVEL_OUTOF10: 8
PAINLEVEL_OUTOF10: 8
PAINLEVEL_OUTOF10: 7
PAINLEVEL_OUTOF10: 9

## 2022-11-06 ASSESSMENT — PAIN DESCRIPTION - DESCRIPTORS
DESCRIPTORS: BURNING;ACHING;PRESSURE
DESCRIPTORS: ACHING
DESCRIPTORS: ACHING
DESCRIPTORS: ACHING;BURNING;SORE
DESCRIPTORS: ACHING;DULL
DESCRIPTORS: ACHING

## 2022-11-06 ASSESSMENT — PAIN DESCRIPTION - LOCATION
LOCATION: FOOT

## 2022-11-06 ASSESSMENT — PAIN DESCRIPTION - ORIENTATION
ORIENTATION: LEFT

## 2022-11-06 ASSESSMENT — PAIN - FUNCTIONAL ASSESSMENT
PAIN_FUNCTIONAL_ASSESSMENT: ACTIVITIES ARE NOT PREVENTED
PAIN_FUNCTIONAL_ASSESSMENT: PREVENTS OR INTERFERES SOME ACTIVE ACTIVITIES AND ADLS

## 2022-11-06 ASSESSMENT — PAIN DESCRIPTION - ONSET: ONSET: ON-GOING

## 2022-11-06 ASSESSMENT — PAIN DESCRIPTION - PAIN TYPE: TYPE: ACUTE PAIN

## 2022-11-06 ASSESSMENT — PAIN DESCRIPTION - FREQUENCY: FREQUENCY: CONTINUOUS

## 2022-11-06 NOTE — PROGRESS NOTES
Department of Podiatry  Progress Note    SUBJECTIVE:  Patient seen bedside with Dr. Christopher Kim today. She states that her toes were really cold over night and the the top of her foot is less painful today. No acute events overnight. Patient denies any N/V/D/F/C/SOB/CP and has no other pedal complaints at this time. OBJECTIVE:    Scheduled Meds:   gabapentin  100 mg Oral BID    polyethylene glycol  17 g Oral Daily    docusate sodium  100 mg Oral Daily    metoprolol succinate  25 mg Oral BID    piperacillin-tazobactam  4,500 mg IntraVENous Q8H    levoFLOXacin  750 mg Oral Daily    sodium chloride flush  5-40 mL IntraVENous 2 times per day    heparin flush  3 mL IntraVENous 2 times per day    atorvastatin  10 mg Oral Nightly    escitalopram  10 mg Oral Daily    levothyroxine  50 mcg Oral Daily    enoxaparin  40 mg SubCUTAneous Daily     Continuous Infusions:   sodium chloride       PRN Meds:.acetaminophen, sodium chloride flush, sodium chloride, heparin flush, HYDROcodone-acetaminophen    No Known Allergies    BP (!) 183/78   Pulse 55   Temp 98.4 °F (36.9 °C) (Oral)   Resp 18   Ht 5' 1\" (1.549 m)   Wt 175 lb (79.4 kg)   SpO2 98%   BMI 33.07 kg/m²       EXAM:    EXAM:        Pt is AAOx3, NAD     Vascular Exam:  DP and PT pulses are palpable b/l. CFT is unable to be calculated due to ecchymosis and blistering at distal toes. Bilateral lower extremity. Edema is present to left foot. Neuro Exam:  Light touch and protective sensation diminished. Dermatologic Exam: Three separate incisions with sutures intact and skin well coapted. Erythema, edema, and ecchymosis present to forefoot. No malodor, crepitus noted. Slight fluctuance to dorsal lateral foot. No drainage appreciated. Clinical pictures below:      MSK: Muscle strength 5/5 Bilateral  ROM is full without pain or crepitation bilateral.  Pain on palpation Bilateral foot/feet. No pain with calf squeeze.  Posterior compartments soft and compressible. Scheduled Meds:   gabapentin  100 mg Oral BID    polyethylene glycol  17 g Oral Daily    docusate sodium  100 mg Oral Daily    metoprolol succinate  25 mg Oral BID    piperacillin-tazobactam  4,500 mg IntraVENous Q8H    levoFLOXacin  750 mg Oral Daily    sodium chloride flush  5-40 mL IntraVENous 2 times per day    heparin flush  3 mL IntraVENous 2 times per day    atorvastatin  10 mg Oral Nightly    escitalopram  10 mg Oral Daily    levothyroxine  50 mcg Oral Daily    enoxaparin  40 mg SubCUTAneous Daily     Continuous Infusions:   sodium chloride       PRN Meds:.acetaminophen, sodium chloride flush, sodium chloride, heparin flush, HYDROcodone-acetaminophen    RADIOLOGY:  CT FOOT LEFT WO CONTRAST   Final Result   1. Satisfactory position of internally fixated fracture involving proximal   phalanx of the 1st digit   2. Minimally displaced, oblique fracture involving the 2nd metatarsal bone. Internal fixating screw seen in the distal aspect of 2nd metatarsal bone. 3. Additional surgical clips are seen within plantar soft tissue below 2nd   metatarsal bone. 4. No subcutaneous gas or evidence of osteomyelitis. XR FOOT LEFT (MIN 3 VIEWS)   Final Result   Postsurgical changes.            BP (!) 183/78   Pulse 55   Temp 98.4 °F (36.9 °C) (Oral)   Resp 18   Ht 5' 1\" (1.549 m)   Wt 175 lb (79.4 kg)   SpO2 98%   BMI 33.07 kg/m²     LABS:    Recent Labs     11/04/22  1539 11/06/22  1501   WBC 9.6 10.0   HGB 13.6 12.1   HCT 40.7 36.4    268          Recent Labs     11/06/22  1501      K 3.5      CO2 22   BUN 9   CREATININE 0.8          Recent Labs     11/04/22  1539 11/06/22  1501   PROT 7.3 6.6           ASSESSMENT:  -S/p akin procedure, weil osteotomy of 2nd metatarsal and 2nd, 3rd, and 4th soft tissue release DOS 10/26/22  -Post operative surgical site infection      Bedside Procedure Performed today as described below.:    - First betadine was used to cleanse all 4 digits as well as incisions with sutures. - Attention was then directed toward the incision along the second metatarsal where every other suture was removed. The incision over the third metatarsal was then treated the same way: every other suture was removed. Finally, every other suture in the incision over the Hallux was removed. - A sterile 25 gauge needle was then used to poke holes in the roof of the blister at the medial side of the first digit. After a hole was poked, pressure was applied to the area with a saline soaked 4x4 gauze until a pinkish color returned to that area. This was repeated with the distal tip of the Hallux, lateral Hallux, and 2nd through 4th digits. - The area was dressed with betadine soaked adaptic, 4x4 gauze over the toes, dorsum of the foot and anterior ankle, and krelix. PLAN:  -Patient examined and evaluated at bedside  -All pertinent labs, charts, and imaging reviewed prior to encounter   -WBC: 10.0, ESR and CRP pending  -Outpatient wound culture: Enterobacter Cloacae, GNR Oxidase positive  -Repeat wound culture pending: preliminary shows gram negative rods. -ID consulted, abx per their rec  -L foot CT:   1. Satisfactory position of internally fixated fracture involving proximal   phalanx of the 1st digit   2. Minimally displaced, oblique fracture involving the 2nd metatarsal bone. Internal fixating screw seen in the distal aspect of 2nd metatarsal bone. 3. Additional surgical clips are seen within plantar soft tissue below 2nd   metatarsal bone.    4. No subcutaneous gas or evidence of osteomyelitis.   -L foot x ray shows Postsurgical changes  -Wound cleansed with betadine and dressed in DSD  -Pt discussed with Dr. Eulogio Oneal

## 2022-11-06 NOTE — PROGRESS NOTES
Internal Medicine Progress Note      Synopsis: Patient admitted on 11/4/2022     Ms. Sinan Lancaster, a 74y.o. year old female  who  has a past medical history of Hypertension and Thyroid disease. This very pleasant lady has undergone a bunion surgery on the 26. This was followed by a up by podiatry. Not only did she have a can procedure but she also had we will osteotomy of the second metatarsal and second and third fourth toe soft tissue releases. Patient noticed redness and swelling on the fourth day postop and she has been significantly worse. Her cultures were polymicrobial including Enterobacter cloacae and gram-negative lucrecia oxidase positive. She was on 2 different antibiotics but she was not feeling well and she continues to look worse on the clinical presentation with some early softness and moisture to the postop stitches and blueness to at least one of her toes in the form of a blister     Subjective    Clinically improving. Feeling better. Stable overnight. No other overnight issues reported. November 6  She feels slightly worse. Blood pressure is elevated. Her toes are numb and discoloration is worse    Exam:  BP (!) 183/78   Pulse 55   Temp 98.4 °F (36.9 °C) (Oral)   Resp 18   Ht 5' 1\" (1.549 m)   Wt 175 lb (79.4 kg)   SpO2 98%   BMI 33.07 kg/m²   General appearance: No apparent distress, appears stated age and cooperative. HEENT: Pupils equal, round, and reactive to light. Conjunctivae/corneas clear. Neck: Supple. No jugular venous distention. Trachea midline. Respiratory:  Normal respiratory effort. Clear to auscultation, bilaterally without Rales/Wheezes/Rhonchi. Cardiovascular: Regular rate and rhythm with normal S1/S2 without murmurs, rubs or gallops. Abdomen: Soft, non-tender, non-distended with normal bowel sounds. Musculoskeletal: No clubbing, cyanosis   Left foot is wrapped. Discoloration still noted.   Drain is a little bit less over the dorsum aspect of the left big toe incision line  Skin:  No rashes    Neurologic: awake, alert and following commands     Medications:  Reviewed    Infusion Medications    sodium chloride       Scheduled Medications    gabapentin  100 mg Oral BID    polyethylene glycol  17 g Oral Daily    docusate sodium  100 mg Oral Daily    piperacillin-tazobactam  4,500 mg IntraVENous Q8H    levoFLOXacin  750 mg Oral Daily    sodium chloride flush  5-40 mL IntraVENous 2 times per day    heparin flush  3 mL IntraVENous 2 times per day    atorvastatin  10 mg Oral Nightly    escitalopram  10 mg Oral Daily    levothyroxine  50 mcg Oral Daily    metoprolol succinate  25 mg Oral Daily    enoxaparin  40 mg SubCUTAneous Daily     PRN Meds: acetaminophen, sodium chloride flush, sodium chloride, heparin flush, HYDROcodone-acetaminophen    I/O    Intake/Output Summary (Last 24 hours) at 11/6/2022 1148  Last data filed at 11/6/2022 0458  Gross per 24 hour   Intake 227.54 ml   Output --   Net 227.54 ml         Labs:   Recent Labs     11/04/22  0902 11/04/22  1539   WBC 9.1 9.6   HGB 13.6 13.6   HCT 40.8 40.7    288         Recent Labs     11/04/22  1539      K 4.1      CO2 21*   BUN 10   CREATININE 0.7   CALCIUM 9.6         Recent Labs     11/04/22  1539   PROT 7.3   ALKPHOS 119*   ALT 32   AST 38*   BILITOT 1.2   LIPASE 32             Radiology:  XR FOOT LEFT (MIN 3 VIEWS)    Result Date: 11/4/2022  Postsurgical changes. CT FOOT LEFT WO CONTRAST    Result Date: 11/4/2022  1. Satisfactory position of internally fixated fracture involving proximal phalanx of the 1st digit 2. Minimally displaced, oblique fracture involving the 2nd metatarsal bone. Internal fixating screw seen in the distal aspect of 2nd metatarsal bone. 3. Additional surgical clips are seen within plantar soft tissue below 2nd metatarsal bone. 4. No subcutaneous gas or evidence of osteomyelitis.      FLUORO FOR SURGICAL PROCEDURES    Result Date: 10/27/2022  Intraoperative left forefoot with fluoroscopy. No complication seen. Please see separate operative report for details. ASSESSMENT:    Principal Problem:    Post op infection  Active Problems:    Left foot infection    Hypertension    HLD (hyperlipidemia)  Resolved Problems:    * No resolved hospital problems. *       PLAN:  1. Await infectious disease today  2. Maintaining podiatry coverage  3. Follow-up labs awaited  4. Tylenol ordered for mild pain control  November 6  Discussed with infectious disease. We needed podiatry to look at her and possibly consider for surgery. She has a certain amount of darkness to the undersurface of the toes. she is afebrile still. Follow-up labs. Double up on her and hypertensives. Add stool softener. There is also a line of demarcation. Diet: ADULT DIET; Regular; 4 carb choices (60 gm/meal)  Code Status: Full Code  PT/OT Eval Status:   Once cleared by podiatry for weightbearing  DVT Prophylaxis:   In place  Recommended disposition at discharge: Hopefully home    +++++++++++++++++++++++++++++++++++++++++++++++++  Reg Dye MD   [facility].  +++++++++++++++++++++++++++++++++++++++++++++++++  NOTE: This report was transcribed using voice recognition software. Every effort was made to ensure accuracy; however, inadvertent computerized transcription errors may be present.

## 2022-11-06 NOTE — PROGRESS NOTES
[address] [place] [address] [facility]  TIFFANIEIDA  Progress Note    SUBJECTIVE:  Chief Complaint   Patient presents with    Foot Pain     Left foot pain after foot surgery 10/26, was seen today at podiatrist office / confirmed infection today at office, was started on omadacycline     Emesis     X 1 on way to ER      Patient is tolerating medications. No reported adverse drug reactions. No nausea, vomiting, diarrhea. Complains of cold-neuropathy-like symptoms to her left foot    Review of systems:  As stated above in the chief complaint, otherwise negative. Medications:  Scheduled Meds:   piperacillin-tazobactam  4,500 mg IntraVENous Q8H    levoFLOXacin  750 mg Oral Daily    lidocaine PF  5 mL IntraDERmal Once    sodium chloride flush  5-40 mL IntraVENous 2 times per day    heparin flush  3 mL IntraVENous 2 times per day    atorvastatin  10 mg Oral Nightly    escitalopram  10 mg Oral Daily    levothyroxine  50 mcg Oral Daily    metoprolol succinate  25 mg Oral Daily    enoxaparin  40 mg SubCUTAneous Daily     Continuous Infusions:   sodium chloride       PRN Meds:acetaminophen, sodium chloride flush, sodium chloride, heparin flush, HYDROcodone-acetaminophen    OBJECTIVE:  BP (!) 183/78   Pulse 55   Temp 98.4 °F (36.9 °C) (Oral)   Resp 18   Ht 5' 1\" (1.549 m)   Wt 175 lb (79.4 kg)   SpO2 98%   BMI 33.07 kg/m²   Temp  Av.3 °F (36.8 °C)  Min: 98.2 °F (36.8 °C)  Max: 98.4 °F (36.9 °C)  Constitutional: The patient is awake, alert, and oriented. Skin: Warm and dry. No rashes were noted. HEENT: Round and reactive pupils. Moist mucous membranes. No ulcerations or thrush. Neck: Supple to movements. Chest: No use of accessory muscles to breathe. Symmetrical expansion. No wheezing, crackles or rhonchi. Cardiovascular: S1 and S2 are rhythmic and regular. No murmurs appreciated. Abdomen: Positive bowel sounds to auscultation. Benign to palpation. No masses felt.  No hepatosplenomegaly. Extremities: No clubbing, no cyanosis, left foot is wrapped toes exposed erythematous warm, painfull.   Lines: peripheral    Laboratory and Tests Review:  Lab Results   Component Value Date    WBC 9.6 11/04/2022    WBC 9.1 11/04/2022    HGB 13.6 11/04/2022    HCT 40.7 11/04/2022    MCV 88.7 11/04/2022     11/04/2022     Lab Results   Component Value Date    NEUTROABS 7.49 (H) 11/04/2022     No results found for: CRPHS  Lab Results   Component Value Date    ALT 32 11/04/2022    AST 38 (H) 11/04/2022    ALKPHOS 119 (H) 11/04/2022    BILITOT 1.2 11/04/2022     Lab Results   Component Value Date/Time     11/04/2022 03:39 PM    K 4.1 11/04/2022 03:39 PM     11/04/2022 03:39 PM    CO2 21 11/04/2022 03:39 PM    BUN 10 11/04/2022 03:39 PM    CREATININE 0.7 11/04/2022 03:39 PM    LABGLOM >60 11/04/2022 03:39 PM    GLUCOSE 118 11/04/2022 03:39 PM    PROT 7.3 11/04/2022 03:39 PM    LABALBU 4.5 11/04/2022 03:39 PM    CALCIUM 9.6 11/04/2022 03:39 PM    BILITOT 1.2 11/04/2022 03:39 PM    ALKPHOS 119 11/04/2022 03:39 PM    AST 38 11/04/2022 03:39 PM    ALT 32 11/04/2022 03:39 PM     Lab Results   Component Value Date    CRP 2.8 (H) 11/04/2022     Lab Results   Component Value Date    SEDRATE 12 11/04/2022     Radiology:    Microbiology:  Wound culture in 11/1/2022: Enterobacter cloacae (resistant to Augmentin, Cefazolin, Cefoxitin), pansensitive Pseudomonas stutzeri    Blood culture 11/4/2022: Negative so far  Wound culture 11/4/2022: Pending     Assessment:  Status post reduction of hallux valgus deformity and subluxed second and third left toes correction and fourth toe capsulotomy of the left foot on 10/26/2022  Surgical site infection with possible early hardware infection     Plan:    Continue Zosyn and Levofloxacin  Check cultures  Will follow with you    Informed Consent for PICC:  I explained the risks, benefits, alternative options, and potential complications associated with insertion of Peripherally Inserted Central Catheter (PICC) with the patient prior to the procedure. Chandu Guthrie, APRN - CNS  10:04 AM  11/6/2022     Patient seen and examined. I had a face to face encounter with the patient. Agree with exam.  Assessment and plan as outlined above and directed by me. Addition and corrections were done as deemed appropriate. My exam and plan include: The patient had a PICC inserted. She was seen with and discussed case with Dr. Domonique Dennis. The left foot is still discolored with some ecchymosis and blistering. No purulent drainage. Sutures noted. No surgical plans for debridement. She will need a minimum of 6 weeks of IV and oral antibiotic. Discussed with Dr. Radha Crespo.     Dann Curtis MD  11/6/2022  12:26 PM

## 2022-11-07 LAB
ALBUMIN SERPL-MCNC: 3.6 G/DL (ref 3.5–5.2)
ALP BLD-CCNC: 100 U/L (ref 35–104)
ALT SERPL-CCNC: 27 U/L (ref 0–32)
ANION GAP SERPL CALCULATED.3IONS-SCNC: 8 MMOL/L (ref 7–16)
AST SERPL-CCNC: 26 U/L (ref 0–31)
BILIRUB SERPL-MCNC: 1 MG/DL (ref 0–1.2)
BUN BLDV-MCNC: 11 MG/DL (ref 6–23)
CALCIUM SERPL-MCNC: 9.2 MG/DL (ref 8.6–10.2)
CHLORIDE BLD-SCNC: 104 MMOL/L (ref 98–107)
CO2: 24 MMOL/L (ref 22–29)
CREAT SERPL-MCNC: 0.9 MG/DL (ref 0.5–1)
EKG ATRIAL RATE: 50 BPM
EKG P AXIS: 53 DEGREES
EKG P-R INTERVAL: 162 MS
EKG Q-T INTERVAL: 484 MS
EKG QRS DURATION: 74 MS
EKG QTC CALCULATION (BAZETT): 441 MS
EKG R AXIS: 19 DEGREES
EKG T AXIS: 37 DEGREES
EKG VENTRICULAR RATE: 50 BPM
GFR SERPL CREATININE-BSD FRML MDRD: >60 ML/MIN/1.73
GLUCOSE BLD-MCNC: 97 MG/DL (ref 74–99)
HCT VFR BLD CALC: 32.6 % (ref 34–48)
HEMOGLOBIN: 10.7 G/DL (ref 11.5–15.5)
MCH RBC QN AUTO: 28.8 PG (ref 26–35)
MCHC RBC AUTO-ENTMCNC: 32.8 % (ref 32–34.5)
MCV RBC AUTO: 87.9 FL (ref 80–99.9)
ORGANISM: ABNORMAL
PDW BLD-RTO: 13 FL (ref 11.5–15)
PLATELET # BLD: 233 E9/L (ref 130–450)
PMV BLD AUTO: 9.7 FL (ref 7–12)
POTASSIUM SERPL-SCNC: 3.7 MMOL/L (ref 3.5–5)
RBC # BLD: 3.71 E12/L (ref 3.5–5.5)
SODIUM BLD-SCNC: 136 MMOL/L (ref 132–146)
TOTAL PROTEIN: 5.7 G/DL (ref 6.4–8.3)
WBC # BLD: 7.6 E9/L (ref 4.5–11.5)
WOUND/ABSCESS: ABNORMAL

## 2022-11-07 PROCEDURE — 6370000000 HC RX 637 (ALT 250 FOR IP): Performed by: SPECIALIST

## 2022-11-07 PROCEDURE — 1200000000 HC SEMI PRIVATE

## 2022-11-07 PROCEDURE — 6360000002 HC RX W HCPCS: Performed by: SPECIALIST

## 2022-11-07 PROCEDURE — 36415 COLL VENOUS BLD VENIPUNCTURE: CPT

## 2022-11-07 PROCEDURE — 6370000000 HC RX 637 (ALT 250 FOR IP): Performed by: INTERNAL MEDICINE

## 2022-11-07 PROCEDURE — 6360000002 HC RX W HCPCS: Performed by: INTERNAL MEDICINE

## 2022-11-07 PROCEDURE — 80053 COMPREHEN METABOLIC PANEL: CPT

## 2022-11-07 PROCEDURE — 93005 ELECTROCARDIOGRAM TRACING: CPT | Performed by: INTERNAL MEDICINE

## 2022-11-07 PROCEDURE — 2580000003 HC RX 258: Performed by: SPECIALIST

## 2022-11-07 PROCEDURE — 85027 COMPLETE CBC AUTOMATED: CPT

## 2022-11-07 PROCEDURE — 93010 ELECTROCARDIOGRAM REPORT: CPT | Performed by: INTERNAL MEDICINE

## 2022-11-07 RX ORDER — LEVOFLOXACIN 750 MG/1
750 TABLET ORAL DAILY
Qty: 40 TABLET | Refills: 0 | Status: SHIPPED | OUTPATIENT
Start: 2022-11-08 | End: 2022-12-18

## 2022-11-07 RX ADMIN — METOPROLOL SUCCINATE 25 MG: 25 TABLET, EXTENDED RELEASE ORAL at 09:16

## 2022-11-07 RX ADMIN — DOCUSATE SODIUM 100 MG: 100 CAPSULE, LIQUID FILLED ORAL at 09:16

## 2022-11-07 RX ADMIN — SODIUM CHLORIDE, PRESERVATIVE FREE 10 ML: 5 INJECTION INTRAVENOUS at 20:58

## 2022-11-07 RX ADMIN — PIPERACILLIN AND TAZOBACTAM 4500 MG: 4; .5 INJECTION, POWDER, FOR SOLUTION INTRAVENOUS at 14:43

## 2022-11-07 RX ADMIN — PIPERACILLIN AND TAZOBACTAM 4500 MG: 4; .5 INJECTION, POWDER, FOR SOLUTION INTRAVENOUS at 06:05

## 2022-11-07 RX ADMIN — LEVOTHYROXINE SODIUM 50 MCG: 0.05 TABLET ORAL at 06:06

## 2022-11-07 RX ADMIN — GABAPENTIN 100 MG: 100 CAPSULE ORAL at 09:15

## 2022-11-07 RX ADMIN — METOPROLOL SUCCINATE 25 MG: 25 TABLET, EXTENDED RELEASE ORAL at 20:57

## 2022-11-07 RX ADMIN — ATORVASTATIN CALCIUM 10 MG: 10 TABLET, FILM COATED ORAL at 20:58

## 2022-11-07 RX ADMIN — HYDROCODONE BITARTRATE AND ACETAMINOPHEN 1 TABLET: 5; 325 TABLET ORAL at 22:34

## 2022-11-07 RX ADMIN — HYDROCODONE BITARTRATE AND ACETAMINOPHEN 1 TABLET: 5; 325 TABLET ORAL at 14:37

## 2022-11-07 RX ADMIN — Medication 300 UNITS: at 20:58

## 2022-11-07 RX ADMIN — ENOXAPARIN SODIUM 40 MG: 100 INJECTION SUBCUTANEOUS at 09:15

## 2022-11-07 RX ADMIN — ESCITALOPRAM OXALATE 10 MG: 10 TABLET ORAL at 09:16

## 2022-11-07 RX ADMIN — HYDROCODONE BITARTRATE AND ACETAMINOPHEN 1 TABLET: 5; 325 TABLET ORAL at 18:33

## 2022-11-07 RX ADMIN — HYDROCODONE BITARTRATE AND ACETAMINOPHEN 1 TABLET: 5; 325 TABLET ORAL at 10:34

## 2022-11-07 RX ADMIN — HYDROCODONE BITARTRATE AND ACETAMINOPHEN 1 TABLET: 5; 325 TABLET ORAL at 01:21

## 2022-11-07 RX ADMIN — LEVOFLOXACIN 750 MG: 500 TABLET, FILM COATED ORAL at 09:16

## 2022-11-07 RX ADMIN — HYDROCODONE BITARTRATE AND ACETAMINOPHEN 1 TABLET: 5; 325 TABLET ORAL at 06:02

## 2022-11-07 RX ADMIN — PIPERACILLIN AND TAZOBACTAM 4500 MG: 4; .5 INJECTION, POWDER, FOR SOLUTION INTRAVENOUS at 22:38

## 2022-11-07 RX ADMIN — GABAPENTIN 100 MG: 100 CAPSULE ORAL at 20:58

## 2022-11-07 ASSESSMENT — PAIN SCALES - GENERAL
PAINLEVEL_OUTOF10: 8
PAINLEVEL_OUTOF10: 7
PAINLEVEL_OUTOF10: 8

## 2022-11-07 ASSESSMENT — PAIN DESCRIPTION - DESCRIPTORS
DESCRIPTORS: ACHING;DISCOMFORT
DESCRIPTORS: ACHING;DISCOMFORT
DESCRIPTORS: ACHING;DISCOMFORT;PINS AND NEEDLES
DESCRIPTORS: ACHING
DESCRIPTORS: THROBBING
DESCRIPTORS: THROBBING

## 2022-11-07 ASSESSMENT — PAIN DESCRIPTION - LOCATION
LOCATION: FOOT

## 2022-11-07 ASSESSMENT — PAIN DESCRIPTION - ORIENTATION
ORIENTATION: LEFT

## 2022-11-07 ASSESSMENT — PAIN - FUNCTIONAL ASSESSMENT: PAIN_FUNCTIONAL_ASSESSMENT: PREVENTS OR INTERFERES SOME ACTIVE ACTIVITIES AND ADLS

## 2022-11-07 NOTE — PROGRESS NOTES
Department of Podiatry  Progress Note    SUBJECTIVE:  Patient seen bedside today where she states shes feeling better. No acute events overnight. Patient denies any N/V/D/F/C/SOB/CP and has no other pedal complaints at this time. OBJECTIVE:    Scheduled Meds:   gabapentin  100 mg Oral BID    polyethylene glycol  17 g Oral Daily    docusate sodium  100 mg Oral Daily    metoprolol succinate  25 mg Oral BID    piperacillin-tazobactam  4,500 mg IntraVENous Q8H    levoFLOXacin  750 mg Oral Daily    sodium chloride flush  5-40 mL IntraVENous 2 times per day    heparin flush  3 mL IntraVENous 2 times per day    atorvastatin  10 mg Oral Nightly    escitalopram  10 mg Oral Daily    levothyroxine  50 mcg Oral Daily    enoxaparin  40 mg SubCUTAneous Daily     Continuous Infusions:   sodium chloride       PRN Meds:.acetaminophen, sodium chloride flush, sodium chloride, heparin flush, HYDROcodone-acetaminophen    No Known Allergies    BP (!) 140/67   Pulse 55   Temp 98 °F (36.7 °C) (Oral)   Resp 18   Ht 5' 1\" (1.549 m)   Wt 180 lb (81.6 kg)   SpO2 96%   BMI 34.01 kg/m²       EXAM:    EXAM:        Pt is AAOx3, NAD     Vascular Exam:  DP and PT pulses are palpable b/l. CFT is unable to be calculated due to ecchymosis and blistering at distal toes. Bilateral lower extremity. Edema is present to left foot. Neuro Exam:  Light touch and protective sensation diminished. Dermatologic Exam: Three separate incisions with sutures intact and skin well coapted. Erythema, edema, and ecchymosis present to forefoot. No malodor, crepitus noted. Slight fluctuance to dorsal lateral foot. No drainage appreciated. Clinical pictures below:      MSK: Muscle strength 5/5 Bilateral  ROM is full without pain or crepitation bilateral.  Pain on palpation Bilateral foot/feet. No pain with calf squeeze. Posterior compartments soft and compressible.                     Scheduled Meds:   gabapentin  100 mg Oral BID    polyethylene glycol  17 g Oral Daily    docusate sodium  100 mg Oral Daily    metoprolol succinate  25 mg Oral BID    piperacillin-tazobactam  4,500 mg IntraVENous Q8H    levoFLOXacin  750 mg Oral Daily    sodium chloride flush  5-40 mL IntraVENous 2 times per day    heparin flush  3 mL IntraVENous 2 times per day    atorvastatin  10 mg Oral Nightly    escitalopram  10 mg Oral Daily    levothyroxine  50 mcg Oral Daily    enoxaparin  40 mg SubCUTAneous Daily     Continuous Infusions:   sodium chloride       PRN Meds:.acetaminophen, sodium chloride flush, sodium chloride, heparin flush, HYDROcodone-acetaminophen    RADIOLOGY:  CT FOOT LEFT WO CONTRAST   Final Result   1. Satisfactory position of internally fixated fracture involving proximal   phalanx of the 1st digit   2. Minimally displaced, oblique fracture involving the 2nd metatarsal bone. Internal fixating screw seen in the distal aspect of 2nd metatarsal bone. 3. Additional surgical clips are seen within plantar soft tissue below 2nd   metatarsal bone. 4. No subcutaneous gas or evidence of osteomyelitis. XR FOOT LEFT (MIN 3 VIEWS)   Final Result   Postsurgical changes.            BP (!) 140/67   Pulse 55   Temp 98 °F (36.7 °C) (Oral)   Resp 18   Ht 5' 1\" (1.549 m)   Wt 180 lb (81.6 kg)   SpO2 96%   BMI 34.01 kg/m²     LABS:    Recent Labs     11/06/22  1501 11/07/22  0610   WBC 10.0 7.6   HGB 12.1 10.7*   HCT 36.4 32.6*    233        Recent Labs     11/07/22  0610      K 3.7      CO2 24   BUN 11   CREATININE 0.9        Recent Labs     11/06/22  1501 11/07/22  0610   PROT 6.6 5.7*         ASSESSMENT:  -S/p akin procedure, weil osteotomy of 2nd metatarsal and 2nd, 3rd, and 4th soft tissue release DOS 10/26/22  -Post operative surgical site infection      PLAN:  -Patient examined and evaluated at bedside  -All pertinent labs, charts, and imaging reviewed prior to encounter   -WBC:7.6   -Outpatient wound culture: Enterobacter Cloacae, GNR Oxidase positive  -Repeat wound culture pending: preliminary shows gram negative rods. -ID consulted, abx per their rec  -L foot CT:   1. Satisfactory position of internally fixated fracture involving proximal   phalanx of the 1st digit   2. Minimally displaced, oblique fracture involving the 2nd metatarsal bone. Internal fixating screw seen in the distal aspect of 2nd metatarsal bone. 3. Additional surgical clips are seen within plantar soft tissue below 2nd   metatarsal bone.    4. No subcutaneous gas or evidence of osteomyelitis.   -L foot x ray shows Postsurgical changes  -Wound irrigated with sterile saline and dressed in DSD  -Pt discussed with Dr. Eulogio Oneal

## 2022-11-07 NOTE — PROGRESS NOTES
[address] [place] [address] [facility]  [facility]  Progress Note    SUBJECTIVE:  Chief Complaint   Patient presents with    Foot Pain     Left foot pain after foot surgery 10/26, was seen today at podiatrist office / confirmed infection today at office, was started on omadacycline     Emesis     X 1 on way to ER      Patient is tolerating medications. No reported adverse drug reactions. No nausea, vomiting, diarrhea. She says today is the first day she's felt back to herself  No fevers or chills   Foot is throbbing     Review of systems:  As stated above in the chief complaint, otherwise negative. Medications:  Scheduled Meds:   gabapentin  100 mg Oral BID    polyethylene glycol  17 g Oral Daily    docusate sodium  100 mg Oral Daily    metoprolol succinate  25 mg Oral BID    piperacillin-tazobactam  4,500 mg IntraVENous Q8H    levoFLOXacin  750 mg Oral Daily    sodium chloride flush  5-40 mL IntraVENous 2 times per day    heparin flush  3 mL IntraVENous 2 times per day    atorvastatin  10 mg Oral Nightly    escitalopram  10 mg Oral Daily    levothyroxine  50 mcg Oral Daily    enoxaparin  40 mg SubCUTAneous Daily     Continuous Infusions:   sodium chloride       PRN Meds:acetaminophen, sodium chloride flush, sodium chloride, heparin flush, HYDROcodone-acetaminophen    OBJECTIVE:  BP (!) 140/67   Pulse 55   Temp 98 °F (36.7 °C) (Oral)   Resp 16   Ht 5' 1\" (1.549 m)   Wt 180 lb (81.6 kg)   SpO2 96%   BMI 34.01 kg/m²   Temp  Av.1 °F (36.7 °C)  Min: 98 °F (36.7 °C)  Max: 98.3 °F (36.8 °C)  Constitutional: The patient is awake, alert, and oriented. In no distress. Skin: Warm and dry. No rashes were noted. HEENT: Round and reactive pupils. Moist mucous membranes. No ulcerations or thrush. Neck: Supple to movements. Chest: No use of accessory muscles to breathe. Symmetrical expansion. No wheezing, crackles or rhonchi. Cardiovascular: S1 and S2 are rhythmic and regular. No murmurs appreciated. Abdomen: Positive bowel sounds to auscultation. Benign to palpation. No masses felt. Extremities: No clubbing, no cyanosis, left foot is wrapped. Throbbing pain.   Lines: PICC Line right arm 11/6/2022 43cm     Laboratory and Tests Review:  Lab Results   Component Value Date    WBC 7.6 11/07/2022    WBC 10.0 11/06/2022    WBC 9.6 11/04/2022    HGB 10.7 (L) 11/07/2022    HCT 32.6 (L) 11/07/2022    MCV 87.9 11/07/2022     11/07/2022     Lab Results   Component Value Date    NEUTROABS 7.49 (H) 11/04/2022     No results found for: CRPHS  Lab Results   Component Value Date    ALT 27 11/07/2022    AST 26 11/07/2022    ALKPHOS 100 11/07/2022    BILITOT 1.0 11/07/2022     Lab Results   Component Value Date/Time     11/07/2022 06:10 AM    K 3.7 11/07/2022 06:10 AM     11/07/2022 06:10 AM    CO2 24 11/07/2022 06:10 AM    BUN 11 11/07/2022 06:10 AM    CREATININE 0.9 11/07/2022 06:10 AM    CREATININE 0.8 11/06/2022 03:01 PM    CREATININE 0.7 11/04/2022 03:39 PM    LABGLOM >60 11/07/2022 06:10 AM    GLUCOSE 97 11/07/2022 06:10 AM    PROT 5.7 11/07/2022 06:10 AM    LABALBU 3.6 11/07/2022 06:10 AM    CALCIUM 9.2 11/07/2022 06:10 AM    BILITOT 1.0 11/07/2022 06:10 AM    ALKPHOS 100 11/07/2022 06:10 AM    AST 26 11/07/2022 06:10 AM    ALT 27 11/07/2022 06:10 AM     Lab Results   Component Value Date    CRP 2.8 (H) 11/04/2022     Lab Results   Component Value Date    SEDRATE 12 11/04/2022     Radiology:    Microbiology:  Wound culture in 11/1/2022: Enterobacter cloacae (resistant to Augmentin, Cefazolin, Cefoxitin), pansensitive Pseudomonas stutzeri    Blood culture 11/4/2022 x 2: Negative so far  Wound culture 11/4/2022: Enterobacter cloacae (resistant to Augmentin, Cefazolin, Cefoxitin)     Assessment:  Status post reduction of hallux valgus deformity and subluxed second and third left toes correction and fourth toe capsulotomy of the left foot on 10/26/2022  Surgical site infection with possible early hardware infection     Plan:    Continue Zosyn and Levofloxacin x minimum 6 weeks   Check cultures  Med rec complete from ID standpoint   Will follow with you    MADI Parr CNP  11:08 AM  11/7/2022     Patient seen and examined. I had a face to face encounter with the patient. Agree with exam.  Assessment and plan as outlined above and directed by me. Addition and corrections were done as deemed appropriate. My exam and plan include: The patient is tolerating antibiotics. She is having problems with neuropathy and dysesthesia on the left foot. She will be discharged on 6 weeks of Zosyn and Levofloxacin. Please note that we are basing antibiotics on superficial wound cultures and not on surgical, tissue or bone cultures. Spoke with nursing.     Beth Garcia MD  11/7/2022  11:54 AM

## 2022-11-07 NOTE — CARE COORDINATION
Social Work discharge planning    Sw met with pt, who lives alone. She said her daughter, son in law, and sister are going to be helping her at home with iv atb and needs. She is asking for hhc with [facility], as she said she knows someone that works there. Sw called referral to Mayra with [facility] now. Noted ID advised pt will need iv atb at discharge. Pt has PICC. Med called referral to [facility] with [facility] as well for iv atb. Med also discussed need for PT OT evals with charge RN Franca Patton  if appropriate per Podiatry to help with discharge planning. Will need HHC order for iv atb and any wound care needs please. Electronically signed by Tammie Jerez on 11/7/2022 at 10:00 AM     Addendum-    MED faxed iv atb Rx to Katerina Hood with [facility]. Awaiting call back from [facility] with [facility] re: their availability to see pt at home. May need another hhc choice. Electronically signed by Tammie Jerez on 11/7/2022 at 11:42 AM     Addendum-    Per [facility] with [facility], they CAN accept pt at discharge. They will need called when discharge date is known to coordinate first home iv atb dose. [address] [facility] office [phone]. Maxine Negro is checking pt's insurance coverage for home iv atb. .  Pt's PT OT AMPCs are pending too. Electronically signed by Tammie Jerez on 11/7/2022 at 2:20 PM         The Plan for Transition of Care is related to the following treatment goals: home with hhc  The Patient was provided with a choice of provider and agrees   with the discharge plan. [x] Yes [] No  Freedom of choice list was provided with basic dialogue that supports the patient's individualized plan of care/goals, treatment preferences and shares the quality data associated with the providers.  [x] Yes [] No

## 2022-11-07 NOTE — DISCHARGE INSTRUCTIONS
[facility] PODIATRY DISCHARGE INSTRUCTIONS:  Patient to keep dressing on until seen in office  Patient to be non-weight bearing on the affected foot with use of scooter or wheelchair  Patient to follow up with Dr. Christene Dubin within 1 week of discharge  Please call [phone] to confirm appointment              [address] [facility])  [address] [facility] [address]  [place], [address]  Phone [phone]   Fax [phone]    Georgette Rodriguez. Rosy Ewing MD, MD Gertrudis Teixeira, MD Anne Carrillo, MD Larissa Reich, MD Esequiel Toussaint, MD Raphael Polo, CNS   Juan Jose Morales, APRN, CNS  Anitha Bloodgoearl, APRN-CNP    Carlos Alegria, APRN, CNS  Grey Worthy, APRN-CNP   Nany Drew MD               STANDING ORDERS (ID Protocol)     Visiting nurses are to write the Primary Care Physician and their own call back number on all laboratory requisition forms. Abnormal lab values are called to the physician by the nurse and NOT by the laboratory. Fax all labs to the office in a timely manner, during office hours. All faxes should include nurses name and call back number. Vascular Access Devices or VADs (TLC, PICC, Midline, etc) will be replaced as necessary. Draw all blood work from VADs, except for drug levels. If unable to access a VAD, insert a peripheral catheter temporarily. Contact the Primary Care Physician or [facility] office for surgical referral.  Use tPA (Amy Stites) as per agency protocol to restore patency of VAD. Saline flush 10ml or heparin flush 10U/cc IV daily and as needed to maintain line patency. Remove VAD upon completion of IV antibiotics, unless otherwise specified by the ordering physician.   If VAD cannot be removed, schedule appointment at office for removal.  If VAD was placed by Radiology, schedule appointment for removal.  Notify ordering physician or office if patient requires admission to the hospital with reason for admission. Discontinue all blood work upon completion of IV antibiotics, unless otherwise specified by ordering physician. Notify ordering physician if the patient does not receive the scheduled antibiotic for 24 hours or more. The Pharmacy and [address] [place] [address] may adjust the timing of the infusion and blood work to accommodate the patients home care conditions. When PICC or VAD is to be removed, documentation of length of inserted PICC. PICC or VAD length is to correlate with inserted length and sent to physician at the time of removal.  Give the patient a list of antibiotics being administered with:  Drug name  Route  Frequency  Start/Stop date      ROUTINE LABS TO BE DRAWN/ORDERED:  Twice weekly (preferably every Monday & Thursday):  CMP  Complete Blood Count with differential (CBC with dif)  Once weekly:  C-Reactive Protein (not high sensitivity CRP)  Erythrocyte/Westergren Sedimentation Rate (WSR or ESR)  Total CPK for patients on Daptomycin (Cubicin®). Obtain CPK more often if the patient is experiencing muscle weakness or myalgias. Clinical Pharmacist is to adjust Vancomycin and Aminoglycosides. Clinical pharmacist is  encouraged to follow: \"Therapeutic Monitoring of Vancomycin for Serious Methicillin-resistant Staphylococcus aureus Infections: A Revised Consensus Guideline and Review by the American Society of Health-system Pharmacists, the Infectious Diseases Society of [address], the Pediatric Infectious Diseases Society, and the Society of Infectious Diseases Pharmacists\" (https://doi.org/10.1093/carmen/frxr153). If a clinical calculator is not available, clinical pharmacist is to follow the orders below:  Draw Vancomycin trough 30 minutes before the third dose  After starting drug, or   After the dosing or interval is changed. If the trough level is between 5 and 20 continue dose as ordered.   Draw troughs twice weekly thereafter until troughs are stable (i.e. until trough is between 10 and 20 mcg/ml for two consecutive laboratory values). Once stable check troughs once weekly or every third dose. Please do not call physician unless the trough is < 5 or >20. If the trough is <20 continue dosing as ordered. If the trough is >20 call the office for further orders. Do not hold the dose while waiting for the trough result. Amingoglycosides (e.g. Gentamicin, Tobramycin and Amikacin) peaks and troughs should be drawn twice weekly (preferably on Mondays and Thursdays) or every third dose. Aminoglycoside peaks are not to be drawn if patient on Once-Daily Dosing (ODD). Call physician or office if the trough is:     >1 for gentamicin,   >2 for tobramycin, or   >5 for amikacin  When clinically indicated obtain:  Urine culture. If the patient has a fever with purulent drainage from Webb or suprapubic catheter, or foul smelling urine. Do not irrigate a clogged Webb catheter. Replace it. Blood cultures and Wound Gram stain with culture & sensitivity. If the patient has a fever or increasing drainage or foul odor from a wound. Notify the treating physician in a timely manner  Stool specimen. If diarrhea occurs while on antibiotics, send stools for C. difficile and WBCs. When a drug is discontinued due to a low white blood cell count (WBCs) draw two consecutive CBC with differential and BUN, Ceatinine. ALLERGIC OR ADVERSE REACTIONS TO MEDICATIONS  Mild reaction: (itching, with or without rash):  Administer Benadryl 50mg po x 1, then 25mg po q6h prn. Notify office or physician in a timely matter. Moderate reaction (itching with or without rash and/or wheezing, dyspnea, itchy throat):  Administer Benadryl 50 mg IV push x 1. Notify office or physician in a timely manner. Severe reaction i.e. Anaphylaxis (wheezing or stidor, sudden rash, lightheadedness, hypotension):  Administer epinephrine subcutaneous 0.3mg (1:1000) x 1 dose. May repeat twice every 5 minutes if needed.   Call EMS and notify office or physician immediately. For all above reactions: administer Solu-Cortef 100mg slow IV push x 1. VASCULAR ACCESS DRESSING CHANGE PROTOCOL  Cleanse insertion site with ChlorPrep® or equivalent three times. Secure catheter with Steri-Strips®, Bone®, or equivalent securing device. Apply Opsite® 3000 or equivalent transparent dressing. Change dressing twice weekly, maintaining sterile technique. If there is a BioPatch®, SilverSite® or equivalent, change once weekly only or as needed. FOLLOW-UP VISITS  Nursing staff should call the office during business hours to schedule a follow-up appointment once the patient is admitted to the service or facility. Every effort should be made to have patient follow-up within 2 weeks of discharge. Exception is made for ventilator-dependent patients. Continue IV antibiotic therapy until patient is seen in the office or unless specific stop date is noted on the original order or unless otherwise ordered by physician. Call office to ensure stop date is correct before stopping antibiotics.         Nicholas Hernandez MD

## 2022-11-07 NOTE — PROGRESS NOTES
Department of Podiatry  Progress Note    SUBJECTIVE:  Patient seen bedside today where she states shes feeling better. No acute events overnight. Patient denies any N/V/D/F/C/SOB/CP and has no other pedal complaints at this time. OBJECTIVE:    Scheduled Meds:   gabapentin  100 mg Oral BID    polyethylene glycol  17 g Oral Daily    docusate sodium  100 mg Oral Daily    metoprolol succinate  25 mg Oral BID    piperacillin-tazobactam  4,500 mg IntraVENous Q8H    levoFLOXacin  750 mg Oral Daily    sodium chloride flush  5-40 mL IntraVENous 2 times per day    heparin flush  3 mL IntraVENous 2 times per day    atorvastatin  10 mg Oral Nightly    escitalopram  10 mg Oral Daily    levothyroxine  50 mcg Oral Daily    enoxaparin  40 mg SubCUTAneous Daily     Continuous Infusions:   sodium chloride       PRN Meds:.acetaminophen, sodium chloride flush, sodium chloride, heparin flush, HYDROcodone-acetaminophen    No Known Allergies    BP (!) 140/67   Pulse 55   Temp 98 °F (36.7 °C) (Oral)   Resp 18   Ht 5' 1\" (1.549 m)   Wt 180 lb (81.6 kg)   SpO2 96%   BMI 34.01 kg/m²       EXAM:    EXAM:        Pt is AAOx3, NAD     Vascular Exam:  DP and PT pulses are palpable b/l. CFT is unable to be calculated due to ecchymosis and blistering at distal toes. Bilateral lower extremity. Edema is present to left foot. Neuro Exam:  Light touch and protective sensation diminished. Dermatologic Exam: Three separate incisions with sutures intact and skin well coapted. Erythema, edema, and residual ecchymosis present to toes 1,2,3,4. No malodor, crepitus noted. No fluctuance to dorsal lateral foot. No drainage appreciated. Clinical pictures below:      MSK: Muscle strength 5/5 Bilateral  ROM is full without pain or crepitation bilateral.  Pain on palpation Bilateral foot/feet. No pain with calf squeeze. Posterior compartments soft and compressible.                     Scheduled Meds:   gabapentin  100 mg Oral BID polyethylene glycol  17 g Oral Daily    docusate sodium  100 mg Oral Daily    metoprolol succinate  25 mg Oral BID    piperacillin-tazobactam  4,500 mg IntraVENous Q8H    levoFLOXacin  750 mg Oral Daily    sodium chloride flush  5-40 mL IntraVENous 2 times per day    heparin flush  3 mL IntraVENous 2 times per day    atorvastatin  10 mg Oral Nightly    escitalopram  10 mg Oral Daily    levothyroxine  50 mcg Oral Daily    enoxaparin  40 mg SubCUTAneous Daily     Continuous Infusions:   sodium chloride       PRN Meds:.acetaminophen, sodium chloride flush, sodium chloride, heparin flush, HYDROcodone-acetaminophen    RADIOLOGY:  CT FOOT LEFT WO CONTRAST   Final Result   1. Satisfactory position of internally fixated fracture involving proximal   phalanx of the 1st digit   2. Minimally displaced, oblique fracture involving the 2nd metatarsal bone. Internal fixating screw seen in the distal aspect of 2nd metatarsal bone. 3. Additional surgical clips are seen within plantar soft tissue below 2nd   metatarsal bone. 4. No subcutaneous gas or evidence of osteomyelitis. XR FOOT LEFT (MIN 3 VIEWS)   Final Result   Postsurgical changes.            BP (!) 140/67   Pulse 55   Temp 98 °F (36.7 °C) (Oral)   Resp 18   Ht 5' 1\" (1.549 m)   Wt 180 lb (81.6 kg)   SpO2 96%   BMI 34.01 kg/m²     LABS:    Recent Labs     11/06/22  1501 11/07/22  0610   WBC 10.0 7.6   HGB 12.1 10.7*   HCT 36.4 32.6*    233          Recent Labs     11/07/22  0610      K 3.7      CO2 24   BUN 11   CREATININE 0.9          Recent Labs     11/06/22  1501 11/07/22  0610   PROT 6.6 5.7*           ASSESSMENT:  -S/p akin procedure, weil osteotomy of 2nd metatarsal and 2nd, 3rd, and 4th soft tissue release DOS 10/26/22  -Post operative surgical site infection      PLAN:  -Patient examined and evaluated at bedside  -All pertinent labs, charts, and imaging reviewed prior to encounter   -WBC:7.6   -Outpatient wound culture: Enterobacter Cloacae, GNR Oxidase positive  -Repeat wound culture pending: preliminary shows gram negative rods. -ID consulted, abx per their rec  -L foot CT:   1. Satisfactory position of internally fixated fracture involving proximal   phalanx of the 1st digit   2. Minimally displaced, oblique fracture involving the 2nd metatarsal bone. Internal fixating screw seen in the distal aspect of 2nd metatarsal bone. 3. Additional surgical clips are seen within plantar soft tissue below 2nd   metatarsal bone. 4. No subcutaneous gas or evidence of osteomyelitis.   -L foot x ray shows Postsurgical changes  -Wound irrigated with sterile saline and dressed in DSD  - Cleared for discharge from podiatry perspective pending medical clearance. Patient to follow up within 1 week of discharge with Dr. Salena Silva.  Dressing to remain clean, dry and intact on discharge until seen in office.  -Pt discussed with Dr. Salena Silva

## 2022-11-07 NOTE — PROGRESS NOTES
Internal Medicine Progress Note      Synopsis: Patient admitted on 11/4/2022     Ms. Elmer Lopez, a 74y.o. year old female  who  has a past medical history of Hypertension and Thyroid disease. This very pleasant lady has undergone a bunion surgery on the 26. This was followed by a up by podiatry. Not only did she have a can procedure but she also had we will osteotomy of the second metatarsal and second and third fourth toe soft tissue releases. Patient noticed redness and swelling on the fourth day postop and she has been significantly worse. Her cultures were polymicrobial including Enterobacter cloacae and gram-negative lucrecia oxidase positive. She was on 2 different antibiotics but she was not feeling well and she continues to look worse on the clinical presentation with some early softness and moisture to the postop stitches and blueness to at least one of her toes in the form of a blister     Subjective    Clinically improving. Feeling better. Stable overnight. No other overnight issues reported. November 6  She feels slightly worse. Blood pressure is elevated. Her toes are numb and discoloration is worse  Nov 7 th    She has been feeling better   Bedside procedure done she is a lot better  Exam:  BP (!) 140/67   Pulse 55   Temp 98 °F (36.7 °C) (Oral)   Resp 18   Ht 5' 1\" (1.549 m)   Wt 180 lb (81.6 kg)   SpO2 96%   BMI 34.01 kg/m²   General appearance: No apparent distress, appears stated age and cooperative. HEENT: Pupils equal, round, and reactive to light. Conjunctivae/corneas clear. Neck: Supple. No jugular venous distention. Trachea midline. Respiratory:  Normal respiratory effort. Clear to auscultation, bilaterally without Rales/Wheezes/Rhonchi. Cardiovascular: Regular rate and rhythm with normal S1/S2 without murmurs, rubs or gallops. Abdomen: Soft, non-tender, non-distended with normal bowel sounds. Musculoskeletal: No clubbing, cyanosis   Left foot is wrapped. Discoloration still noted. Drain is a little bit less over the dorsum aspect of the left big toe incision line  Skin:  No rashes    Neurologic: awake, alert and following commands     Medications:  Reviewed    Infusion Medications    sodium chloride       Scheduled Medications    gabapentin  100 mg Oral BID    polyethylene glycol  17 g Oral Daily    docusate sodium  100 mg Oral Daily    metoprolol succinate  25 mg Oral BID    piperacillin-tazobactam  4,500 mg IntraVENous Q8H    levoFLOXacin  750 mg Oral Daily    sodium chloride flush  5-40 mL IntraVENous 2 times per day    heparin flush  3 mL IntraVENous 2 times per day    atorvastatin  10 mg Oral Nightly    escitalopram  10 mg Oral Daily    levothyroxine  50 mcg Oral Daily    enoxaparin  40 mg SubCUTAneous Daily     PRN Meds: acetaminophen, sodium chloride flush, sodium chloride, heparin flush, HYDROcodone-acetaminophen    I/O    Intake/Output Summary (Last 24 hours) at 11/7/2022 1710  Last data filed at 11/7/2022 1141  Gross per 24 hour   Intake 1141.14 ml   Output --   Net 1141.14 ml         Labs:   Recent Labs     11/06/22  1501 11/07/22  0610   WBC 10.0 7.6   HGB 12.1 10.7*   HCT 36.4 32.6*    233         Recent Labs     11/06/22  1501 11/07/22  0610    136   K 3.5 3.7    104   CO2 22 24   BUN 9 11   CREATININE 0.8 0.9   CALCIUM 9.5 9.2         Recent Labs     11/06/22  1501 11/07/22  0610   PROT 6.6 5.7*   ALKPHOS 118* 100   ALT 35* 27   AST 36* 26   BILITOT 1.1 1.0             Radiology:  XR FOOT LEFT (MIN 3 VIEWS)    Result Date: 11/4/2022  Postsurgical changes. CT FOOT LEFT WO CONTRAST    Result Date: 11/4/2022  1. Satisfactory position of internally fixated fracture involving proximal phalanx of the 1st digit 2. Minimally displaced, oblique fracture involving the 2nd metatarsal bone. Internal fixating screw seen in the distal aspect of 2nd metatarsal bone.  3. Additional surgical clips are seen within plantar soft tissue below 2nd metatarsal bone. 4. No subcutaneous gas or evidence of osteomyelitis. FLUORO FOR SURGICAL PROCEDURES    Result Date: 10/27/2022  Intraoperative left forefoot with fluoroscopy. No complication seen. Please see separate operative report for details. ASSESSMENT:    Principal Problem:    Post op infection  Active Problems:    Left foot infection    Hypertension    HLD (hyperlipidemia)  Resolved Problems:    * No resolved hospital problems. *       PLAN:  1. Await infectious disease today  2. Maintaining podiatry coverage  3. Follow-up labs awaited  4. Tylenol ordered for mild pain control  November 6  Discussed with infectious disease. We needed podiatry to look at her and possibly consider for surgery. She has a certain amount of darkness to the undersurface of the toes. she is afebrile still. Follow-up labs. Double up on her and hypertensives. Add stool softener. There is also a line of demarcation. Nov 7 th 2022    She is feeling a lot of better    She is in Much less distress. She had a bedside procedure done yesterday which involved cleansing and removal of some sutures. It appears that she had some blisters taken care off and pressure was applied for return of color. Blood pressure is better as well  PICC line is in the right arm. If everything is feeling better then she may be discharged as soon as tomorrow  Diet: ADULT DIET; Regular; 4 carb choices (60 gm/meal)  Code Status: Full Code  PT/OT Eval Status:   Once cleared by podiatry for weightbearing  DVT Prophylaxis:   In place  Recommended disposition at discharge: Hopefully home    +++++++++++++++++++++++++++++++++++++++++++++++++  Deborah Rincon MD   [facility].  +++++++++++++++++++++++++++++++++++++++++++++++++  NOTE: This report was transcribed using voice recognition software. Every effort was made to ensure accuracy; however, inadvertent computerized transcription errors may be present.

## 2022-11-07 NOTE — PLAN OF CARE
Problem: Pain  Goal: Verbalizes/displays adequate comfort level or baseline comfort level  11/6/2022 2339 by Mita Arcos RN  Outcome: Progressing  11/6/2022 1700 by Dexter Erazo RN  Outcome: Progressing     Problem: Safety - Adult  Goal: Free from fall injury  11/6/2022 2339 by Mita Arcos RN  Outcome: Progressing  11/6/2022 1700 by Dexter Erazo RN  Outcome: Progressing

## 2022-11-07 NOTE — PATIENT CARE CONFERENCE
[facility] Quality Flow/Interdisciplinary Rounds Progress Note        Quality Flow Rounds held on November 7, 2022    Disciplines Attending:  Bedside Nurse, , , and Nursing Unit Leadership    Nevin Gibbs was admitted on 11/4/2022  3:49 PM    Anticipated Discharge Date:       Disposition:    Jamal Score:  Jamal Scale Score: 21    Readmission Risk              Risk of Unplanned Readmission:  9           Discussed patient goal for the day, patient clinical progression, and barriers to discharge.   The following Goal(s) of the Day/Commitment(s) have been identified:  Diagnostics - Report Results and Labs - Report Results      Shane Norris RN  November 7, 2022

## 2022-11-08 LAB
ALBUMIN SERPL-MCNC: 3.9 G/DL (ref 3.5–5.2)
ALP BLD-CCNC: 115 U/L (ref 35–104)
ALT SERPL-CCNC: 35 U/L (ref 0–32)
ANION GAP SERPL CALCULATED.3IONS-SCNC: 10 MMOL/L (ref 7–16)
AST SERPL-CCNC: 34 U/L (ref 0–31)
BILIRUB SERPL-MCNC: 1 MG/DL (ref 0–1.2)
BUN BLDV-MCNC: 13 MG/DL (ref 6–23)
CALCIUM SERPL-MCNC: 9.2 MG/DL (ref 8.6–10.2)
CHLORIDE BLD-SCNC: 106 MMOL/L (ref 98–107)
CO2: 24 MMOL/L (ref 22–29)
CREAT SERPL-MCNC: 0.9 MG/DL (ref 0.5–1)
GFR SERPL CREATININE-BSD FRML MDRD: >60 ML/MIN/1.73
GLUCOSE BLD-MCNC: 105 MG/DL (ref 74–99)
HCT VFR BLD CALC: 36.7 % (ref 34–48)
HEMOGLOBIN: 12.2 G/DL (ref 11.5–15.5)
MCH RBC QN AUTO: 29.3 PG (ref 26–35)
MCHC RBC AUTO-ENTMCNC: 33.2 % (ref 32–34.5)
MCV RBC AUTO: 88.2 FL (ref 80–99.9)
PDW BLD-RTO: 13 FL (ref 11.5–15)
PLATELET # BLD: 272 E9/L (ref 130–450)
PMV BLD AUTO: 10.1 FL (ref 7–12)
POTASSIUM SERPL-SCNC: 4 MMOL/L (ref 3.5–5)
RBC # BLD: 4.16 E12/L (ref 3.5–5.5)
SODIUM BLD-SCNC: 140 MMOL/L (ref 132–146)
TOTAL PROTEIN: 6.3 G/DL (ref 6.4–8.3)
WBC # BLD: 7.1 E9/L (ref 4.5–11.5)

## 2022-11-08 PROCEDURE — 2580000003 HC RX 258: Performed by: SPECIALIST

## 2022-11-08 PROCEDURE — 36415 COLL VENOUS BLD VENIPUNCTURE: CPT

## 2022-11-08 PROCEDURE — 1200000000 HC SEMI PRIVATE

## 2022-11-08 PROCEDURE — 85027 COMPLETE CBC AUTOMATED: CPT

## 2022-11-08 PROCEDURE — 80053 COMPREHEN METABOLIC PANEL: CPT

## 2022-11-08 PROCEDURE — 6360000002 HC RX W HCPCS: Performed by: SPECIALIST

## 2022-11-08 PROCEDURE — 6360000002 HC RX W HCPCS: Performed by: INTERNAL MEDICINE

## 2022-11-08 PROCEDURE — 6370000000 HC RX 637 (ALT 250 FOR IP): Performed by: INTERNAL MEDICINE

## 2022-11-08 PROCEDURE — 6370000000 HC RX 637 (ALT 250 FOR IP): Performed by: SPECIALIST

## 2022-11-08 RX ORDER — POLYETHYLENE GLYCOL 3350 17 G/17G
17 POWDER, FOR SOLUTION ORAL DAILY
Qty: 527 G | Refills: 1 | Status: SHIPPED | OUTPATIENT
Start: 2022-11-09 | End: 2022-12-09

## 2022-11-08 RX ORDER — GABAPENTIN 100 MG/1
100 CAPSULE ORAL 2 TIMES DAILY
Qty: 90 CAPSULE | Refills: 3 | Status: SHIPPED | OUTPATIENT
Start: 2022-11-08 | End: 2022-12-08

## 2022-11-08 RX ORDER — METOPROLOL SUCCINATE 25 MG/1
25 TABLET, EXTENDED RELEASE ORAL 2 TIMES DAILY
Qty: 30 TABLET | Refills: 3 | Status: SHIPPED | OUTPATIENT
Start: 2022-11-08

## 2022-11-08 RX ORDER — PSEUDOEPHEDRINE HCL 30 MG
100 TABLET ORAL DAILY
Qty: 10 CAPSULE | Refills: 0 | Status: SHIPPED | OUTPATIENT
Start: 2022-11-09 | End: 2022-11-19

## 2022-11-08 RX ADMIN — METOPROLOL SUCCINATE 25 MG: 25 TABLET, EXTENDED RELEASE ORAL at 09:18

## 2022-11-08 RX ADMIN — ATORVASTATIN CALCIUM 10 MG: 10 TABLET, FILM COATED ORAL at 22:37

## 2022-11-08 RX ADMIN — METOPROLOL SUCCINATE 25 MG: 25 TABLET, EXTENDED RELEASE ORAL at 22:37

## 2022-11-08 RX ADMIN — ENOXAPARIN SODIUM 40 MG: 100 INJECTION SUBCUTANEOUS at 09:19

## 2022-11-08 RX ADMIN — LEVOFLOXACIN 750 MG: 500 TABLET, FILM COATED ORAL at 09:18

## 2022-11-08 RX ADMIN — LEVOTHYROXINE SODIUM 50 MCG: 0.05 TABLET ORAL at 06:44

## 2022-11-08 RX ADMIN — GABAPENTIN 100 MG: 100 CAPSULE ORAL at 22:37

## 2022-11-08 RX ADMIN — GABAPENTIN 100 MG: 100 CAPSULE ORAL at 09:19

## 2022-11-08 RX ADMIN — PIPERACILLIN AND TAZOBACTAM 4500 MG: 4; .5 INJECTION, POWDER, FOR SOLUTION INTRAVENOUS at 22:39

## 2022-11-08 RX ADMIN — HYDROCODONE BITARTRATE AND ACETAMINOPHEN 1 TABLET: 5; 325 TABLET ORAL at 19:04

## 2022-11-08 RX ADMIN — HYDROCODONE BITARTRATE AND ACETAMINOPHEN 1 TABLET: 5; 325 TABLET ORAL at 14:57

## 2022-11-08 RX ADMIN — HYDROCODONE BITARTRATE AND ACETAMINOPHEN 1 TABLET: 5; 325 TABLET ORAL at 02:45

## 2022-11-08 RX ADMIN — HYDROCODONE BITARTRATE AND ACETAMINOPHEN 1 TABLET: 5; 325 TABLET ORAL at 10:52

## 2022-11-08 RX ADMIN — PIPERACILLIN AND TAZOBACTAM 4500 MG: 4; .5 INJECTION, POWDER, FOR SOLUTION INTRAVENOUS at 06:52

## 2022-11-08 RX ADMIN — HYDROCODONE BITARTRATE AND ACETAMINOPHEN 1 TABLET: 5; 325 TABLET ORAL at 22:50

## 2022-11-08 RX ADMIN — PIPERACILLIN AND TAZOBACTAM 4500 MG: 4; .5 INJECTION, POWDER, FOR SOLUTION INTRAVENOUS at 14:58

## 2022-11-08 RX ADMIN — HYDROCODONE BITARTRATE AND ACETAMINOPHEN 1 TABLET: 5; 325 TABLET ORAL at 06:44

## 2022-11-08 RX ADMIN — ESCITALOPRAM OXALATE 10 MG: 10 TABLET ORAL at 09:18

## 2022-11-08 ASSESSMENT — PAIN - FUNCTIONAL ASSESSMENT
PAIN_FUNCTIONAL_ASSESSMENT: PREVENTS OR INTERFERES SOME ACTIVE ACTIVITIES AND ADLS

## 2022-11-08 ASSESSMENT — PAIN DESCRIPTION - PAIN TYPE: TYPE: ACUTE PAIN

## 2022-11-08 ASSESSMENT — PAIN DESCRIPTION - ORIENTATION
ORIENTATION: LEFT

## 2022-11-08 ASSESSMENT — PAIN DESCRIPTION - LOCATION
LOCATION: FOOT

## 2022-11-08 ASSESSMENT — PAIN DESCRIPTION - DESCRIPTORS
DESCRIPTORS: ACHING;SHARP;THROBBING
DESCRIPTORS: THROBBING
DESCRIPTORS: ACHING;THROBBING
DESCRIPTORS: THROBBING

## 2022-11-08 ASSESSMENT — PAIN SCALES - GENERAL
PAINLEVEL_OUTOF10: 9
PAINLEVEL_OUTOF10: 7
PAINLEVEL_OUTOF10: 8
PAINLEVEL_OUTOF10: 7

## 2022-11-08 ASSESSMENT — PAIN DESCRIPTION - FREQUENCY: FREQUENCY: CONTINUOUS

## 2022-11-08 NOTE — PROGRESS NOTES
[address] [place] [address] [facility]  TIFFANIEIDA  Progress Note    SUBJECTIVE:  Chief Complaint   Patient presents with    Foot Pain     Left foot pain after foot surgery 10/26, was seen today at podiatrist office / confirmed infection today at office, was started on omadacycline     Emesis     X 1 on way to ER      Patient is tolerating medications. No reported adverse drug reactions. No nausea, vomiting, diarrhea. Eating lunch - says her appetite is improving  Feeling much better overall     Review of systems:  As stated above in the chief complaint, otherwise negative. Medications:  Scheduled Meds:   gabapentin  100 mg Oral BID    polyethylene glycol  17 g Oral Daily    docusate sodium  100 mg Oral Daily    metoprolol succinate  25 mg Oral BID    piperacillin-tazobactam  4,500 mg IntraVENous Q8H    levoFLOXacin  750 mg Oral Daily    sodium chloride flush  5-40 mL IntraVENous 2 times per day    heparin flush  3 mL IntraVENous 2 times per day    atorvastatin  10 mg Oral Nightly    escitalopram  10 mg Oral Daily    levothyroxine  50 mcg Oral Daily    enoxaparin  40 mg SubCUTAneous Daily     Continuous Infusions:   sodium chloride       PRN Meds:acetaminophen, sodium chloride flush, sodium chloride, heparin flush, HYDROcodone-acetaminophen    OBJECTIVE:  BP (!) 141/66   Pulse 52   Temp 98.1 °F (36.7 °C) (Oral)   Resp 18   Ht 5' 1\" (1.549 m)   Wt 180 lb (81.6 kg)   SpO2 98%   BMI 34.01 kg/m²   Temp  Av.8 °F (36.6 °C)  Min: 97.5 °F (36.4 °C)  Max: 98.1 °F (36.7 °C)  Constitutional: The patient is awake, alert, and oriented. In no distress. Eating lunch   Skin: Warm and dry. No rashes were noted. HEENT: Round and reactive pupils. Moist mucous membranes. No ulcerations or thrush. Neck: Supple to movements. Chest: No respiratory distress. Symmetrical expansion. No wheezing, crackles or rhonchi. Cardiovascular: S1 and S2 are rhythmic and regular. No murmurs appreciated.    Abdomen: Positive bowel sounds to auscultation. Benign to palpation. No masses felt. Extremities: No clubbing, no cyanosis, left foot is wrapped.   Lines: PICC Line right arm 11/6/2022 43cm     Laboratory and Tests Review:  Lab Results   Component Value Date    WBC 7.1 11/08/2022    WBC 7.6 11/07/2022    WBC 10.0 11/06/2022    HGB 12.2 11/08/2022    HCT 36.7 11/08/2022    MCV 88.2 11/08/2022     11/08/2022     Lab Results   Component Value Date    NEUTROABS 7.49 (H) 11/04/2022     No results found for: CRP  Lab Results   Component Value Date    ALT 35 (H) 11/08/2022    AST 34 (H) 11/08/2022    ALKPHOS 115 (H) 11/08/2022    BILITOT 1.0 11/08/2022     Lab Results   Component Value Date/Time     11/08/2022 06:50 AM    K 4.0 11/08/2022 06:50 AM     11/08/2022 06:50 AM    CO2 24 11/08/2022 06:50 AM    BUN 13 11/08/2022 06:50 AM    CREATININE 0.9 11/08/2022 06:50 AM    CREATININE 0.9 11/07/2022 06:10 AM    CREATININE 0.8 11/06/2022 03:01 PM    LABGLOM >60 11/08/2022 06:50 AM    GLUCOSE 105 11/08/2022 06:50 AM    PROT 6.3 11/08/2022 06:50 AM    LABALBU 3.9 11/08/2022 06:50 AM    CALCIUM 9.2 11/08/2022 06:50 AM    BILITOT 1.0 11/08/2022 06:50 AM    ALKPHOS 115 11/08/2022 06:50 AM    AST 34 11/08/2022 06:50 AM    ALT 35 11/08/2022 06:50 AM     Lab Results   Component Value Date    CRP 2.8 (H) 11/04/2022     Lab Results   Component Value Date    SEDRATE 12 11/04/2022     Radiology:    Microbiology:  Wound culture in 11/1/2022: Enterobacter cloacae (resistant to Augmentin, Cefazolin, Cefoxitin), pansensitive Pseudomonas stutzeri    Blood culture 11/4/2022 x 2: Negative so far  Wound culture 11/4/2022: Enterobacter cloacae (resistant to Augmentin, Cefazolin, Cefoxitin)     Assessment:  Status post reduction of hallux valgus deformity and subluxed second and third left toes correction and fourth toe capsulotomy of the left foot on 10/26/2022  Surgical site infection with possible early hardware infection     Plan: Continue Zosyn and Levofloxacin x minimum 6 weeks   Check cultures -- antibiotics based on superficial wound cultures and not on surgical, tissue or bone cultures. Instructed patient that continuing her home probiotic would be ok    Med rec complete from ID standpoint - ok to discharge, follow up in our office     MADI Rodriguez - CNP  11:25 AM  11/8/2022     Patient seen and examined. I had a face to face encounter with the patient. Agree with exam.  Assessment and plan as outlined above and directed by me. Addition and corrections were done as deemed appropriate. My exam and plan include: The patient is tolerating antibiotics. Foot is wrapped. She can be discharged on 6 weeks of Zosyn and Levofloxacin. Instructed to call the office and make a follow-up appointment.     Tavares Suarez MD  11/8/2022  1:32 PM

## 2022-11-08 NOTE — PROGRESS NOTES
Department of Podiatry  Progress Note    SUBJECTIVE:  Patient seen bedside today where she states she is seen s/p akin procedure, weil osteotomy of 2nd metatarsal and 2nd, 3rd, and 4th soft tissue release DOS 10/26/22 which developed a surgical site infection. Pt states she is continuing to improve. No acute events overnight. Patient denies any N/V/D/F/C/SOB/CP and has no other pedal complaints at this time. Pt states she may DC home today. OBJECTIVE:    Scheduled Meds:   gabapentin  100 mg Oral BID    polyethylene glycol  17 g Oral Daily    docusate sodium  100 mg Oral Daily    metoprolol succinate  25 mg Oral BID    piperacillin-tazobactam  4,500 mg IntraVENous Q8H    levoFLOXacin  750 mg Oral Daily    sodium chloride flush  5-40 mL IntraVENous 2 times per day    heparin flush  3 mL IntraVENous 2 times per day    atorvastatin  10 mg Oral Nightly    escitalopram  10 mg Oral Daily    levothyroxine  50 mcg Oral Daily    enoxaparin  40 mg SubCUTAneous Daily     Continuous Infusions:   sodium chloride       PRN Meds:.acetaminophen, sodium chloride flush, sodium chloride, heparin flush, HYDROcodone-acetaminophen    No Known Allergies    BP (!) 141/66   Pulse 52   Temp 98.1 °F (36.7 °C) (Oral)   Resp 18   Ht 5' 1\" (1.549 m)   Wt 180 lb (81.6 kg)   SpO2 98%   BMI 34.01 kg/m²       EXAM:    EXAM:        Pt is AAOx3, NAD   Dressing remains CDI. Posterior compartments soft and compressible. Pt able to wiggle toes. Previous exam findings:     Vascular Exam:  DP and PT pulses are palpable b/l. CFT is unable to be calculated due to ecchymosis and blistering at distal toes. Bilateral lower extremity. Edema is present to left foot. Neuro Exam:  Light touch and protective sensation diminished. Dermatologic Exam: Three separate incisions with sutures intact and skin well coapted. Erythema, edema, and residual ecchymosis present to toes 1,2,3,4. No malodor, crepitus noted. No fluctuance to dorsal lateral foot. No drainage appreciated. Clinical pictures below:      MSK: Muscle strength 5/5 Bilateral  ROM is full without pain or crepitation bilateral.  Pain on palpation Bilateral foot/feet. No pain with calf squeeze. Posterior compartments soft and compressible. Scheduled Meds:   gabapentin  100 mg Oral BID    polyethylene glycol  17 g Oral Daily    docusate sodium  100 mg Oral Daily    metoprolol succinate  25 mg Oral BID    piperacillin-tazobactam  4,500 mg IntraVENous Q8H    levoFLOXacin  750 mg Oral Daily    sodium chloride flush  5-40 mL IntraVENous 2 times per day    heparin flush  3 mL IntraVENous 2 times per day    atorvastatin  10 mg Oral Nightly    escitalopram  10 mg Oral Daily    levothyroxine  50 mcg Oral Daily    enoxaparin  40 mg SubCUTAneous Daily     Continuous Infusions:   sodium chloride       PRN Meds:.acetaminophen, sodium chloride flush, sodium chloride, heparin flush, HYDROcodone-acetaminophen    RADIOLOGY:  CT FOOT LEFT WO CONTRAST   Final Result   1. Satisfactory position of internally fixated fracture involving proximal   phalanx of the 1st digit   2. Minimally displaced, oblique fracture involving the 2nd metatarsal bone. Internal fixating screw seen in the distal aspect of 2nd metatarsal bone. 3. Additional surgical clips are seen within plantar soft tissue below 2nd   metatarsal bone. 4. No subcutaneous gas or evidence of osteomyelitis. XR FOOT LEFT (MIN 3 VIEWS)   Final Result   Postsurgical changes.            BP (!) 141/66   Pulse 52   Temp 98.1 °F (36.7 °C) (Oral)   Resp 18   Ht 5' 1\" (1.549 m)   Wt 180 lb (81.6 kg)   SpO2 98%   BMI 34.01 kg/m²     LABS:    Recent Labs     11/07/22  0610 11/08/22  0650   WBC 7.6 7.1   HGB 10.7* 12.2   HCT 32.6* 36.7    272        Recent Labs     11/08/22  0650      K 4.0      CO2 24   BUN 13   CREATININE 0.9        Recent Labs     11/07/22  0610 11/08/22  0650   PROT 5.7* 6.3* ASSESSMENT:  -S/p akin procedure, weil osteotomy of 2nd metatarsal and 2nd, 3rd, and 4th soft tissue release DOS 10/26/22  -Post operative surgical site infection      PLAN:  -Patient examined and evaluated at bedside  -All pertinent labs, charts, and imaging reviewed prior to encounter   -WBC: 7.1   -Outpatient wound culture: Enterobacter Cloacae, GNR Oxidase positive  -Repeat wound culture: Enterobacter cloacae  -ID consulted, abx per their rec  -L foot CT:   1. Satisfactory position of internally fixated fracture involving proximal   phalanx of the 1st digit   2. Minimally displaced, oblique fracture involving the 2nd metatarsal bone. Internal fixating screw seen in the distal aspect of 2nd metatarsal bone. 3. Additional surgical clips are seen within plantar soft tissue below 2nd   metatarsal bone. 4. No subcutaneous gas or evidence of osteomyelitis.   -L foot x ray shows Postsurgical changes  - Cleared for discharge from podiatry perspective pending medical clearance. Patient to follow up within 1 week of discharge with Dr. Tani Blackmon.  Dressing to remain clean, dry and intact on discharge until seen in office.  -Pt discussed with Dr. Tani Blackmon

## 2022-11-08 NOTE — PATIENT CARE CONFERENCE
P Quality Flow/Interdisciplinary Rounds Progress Note        Quality Flow Rounds held on November 8, 2022    Disciplines Attending:  Bedside Nurse, , , and Nursing Unit Leadership    Mono Tinsley was admitted on 11/4/2022  3:49 PM    Anticipated Discharge Date:       Disposition:    Jamal Score:  Jamal Scale Score: 21    Readmission Risk              Risk of Unplanned Readmission:  10           Discussed patient goal for the day, patient clinical progression, and barriers to discharge.   The following Goal(s) of the Day/Commitment(s) have been identified:  Discharge - Obtain Order      Juan Gibson RN  November 8, 2022

## 2022-11-08 NOTE — PLAN OF CARE
Problem: Pain  Goal: Verbalizes/displays adequate comfort level or baseline comfort level  11/8/2022 0036 by Zainab Siddiqi  Outcome: Progressing  11/7/2022 1751 by Caryn Bullock RN  Outcome: Progressing     Problem: Safety - Adult  Goal: Free from fall injury  11/8/2022 0036 by Zainab Siddiqi  Outcome: Progressing  11/7/2022 1751 by Caryn Bullock RN  Outcome: Progressing

## 2022-11-08 NOTE — PROGRESS NOTES
Comprehensive Nutrition Assessment    Type and Reason for Visit:  Initial, Consult, Wound    Nutrition Recommendations/Plan:   Continue current diet  Will add Bobby BID and Gelatein BID     Malnutrition Assessment:  Malnutrition Status:  Insufficient data (11/08/22 0944)    Context:  Acute Illness     Findings of the 6 clinical characteristics of malnutrition:  Energy Intake:  No significant decrease in energy intake  Weight Loss:  Unable to assess (d/t lack of wt hx per EMR)     Body Fat Loss:  No significant body fat loss     Muscle Mass Loss:  No significant muscle mass loss    Fluid Accumulation:  No significant fluid accumulation     Strength:  Not Performed    Nutrition Assessment:    Pt w/ L foot post-op site infection s/p recent hx akin procedure, weil osteotomy 2nd metatarsal & 2nd/3rd/4th soft tissue release 10/26. Pt generally consuming >75% of meals, will add ONS to aid in wound healing. Nutrition Related Findings:    A&O, +I&Os, no edema, abd soft/rounded, +BS, Glu 105 Wound Type: Surgical Incision       Current Nutrition Intake & Therapies:    Average Meal Intake: %  Average Supplements Intake: None Ordered  ADULT DIET; Regular; 4 carb choices (60 gm/meal)    Anthropometric Measures:  Height: 5' 1\" (154.9 cm)  Ideal Body Weight (IBW): 105 lbs (48 kg)       Current Body Weight: 180 lb (81.6 kg), 171.4 % IBW. Weight Source: Not Specified (11/8)  Current BMI (kg/m2): 34  Usual Body Weight:  (no actual wt hx per EMR)     Weight Adjustment For: No Adjustment                 BMI Categories: Obese Class 1 (BMI 30.0-34. 9)    Estimated Daily Nutrient Needs:  Energy Requirements Based On: Formula  Weight Used for Energy Requirements: Current  Energy (kcal/day):   Weight Used for Protein Requirements: Ideal  Protein (g/day): 70-80 (1.5-1.7)  Method Used for Fluid Requirements: 1 ml/kcal  Fluid (ml/day):     Nutrition Diagnosis:   Increased nutrient needs related to increase demand for energy/nutrients as evidenced by wounds    Nutrition Interventions:   Food and/or Nutrient Delivery: Continue Current Diet, Start Oral Nutrition Supplement  Nutrition Education/Counseling: No recommendation at this time  Coordination of Nutrition Care: Continue to monitor while inpatient       Goals:     Goals: PO intake 75% or greater, by next RD assessment       Nutrition Monitoring and Evaluation:      Food/Nutrient Intake Outcomes: Food and Nutrient Intake, Supplement Intake  Physical Signs/Symptoms Outcomes: Biochemical Data, GI Status, Fluid Status or Edema, Nutrition Focused Physical Findings, Skin, Weight    Discharge Planning:     Too soon to determine     Francesca Rouse RD, LD  Contact: [phone]

## 2022-11-08 NOTE — CARE COORDINATION
Social Work discharge planning    Pt said she has a ww and knee scooter at home. Per Chrystine Goldmann with [facility] [phone], pt's iv atb cost for FIRST week is $557 with copay and per vic. He advised cost may change the 2nd week and following weeks based on her copay and out of pocket max with her insurance. Pt notified by Sw and agreeable. SW notified Chrystine Goldmann with [facility] that pt agreeable. [facility] [phone] also following to accept once discharge date known. Both will need called to coordinate first home iv atb dose on day of discharge. Electronically signed by Lila Zapata on 11/8/2022 at 12:09 PM     Addendum-    Per Mayra with [address], they can see pt Thursday 11/10 am instead of tomorrow am for morning dose at home. Mayra said she will notify [facility] to plan for 11/10/22.   MED discussed with pt, charge HILARIO Sosa and pt's Hilario Blevins  Electronically signed by Lila Zapata on 11/8/2022 at 1:42 PM

## 2022-11-08 NOTE — DISCHARGE SUMMARY
Internal Medicine Discharge Summary    Patient ID: Nevin Gibbs      Patient's PCP: Clarissa Ballesteros MD    Admit Date: 11/4/2022     Discharge Date:   11 8 22    Admitting Physician: Clarissa Ballesteros MD     Discharge Physician: Clarissa Ballesteros MD     Discharge Diagnoses: Active Hospital Problems    Diagnosis Date Noted    Left foot infection [L08.9] 11/04/2022     Priority: Medium    Post op infection Ramonia Dion 11/04/2022     Priority: Medium    Hypertension [I10] 11/04/2022     Priority: Medium    HLD (hyperlipidemia) [E78.5] 11/04/2022     Priority: Medium       The patient was seen and examined on day of discharge and this discharge summary is in conjunction with any daily progress note from day of discharge. Hospital Course:     Ms. Nevin Gibbs, a 74y.o. year old female  who  has a past medical history of Hypertension and Thyroid disease. This very pleasant lady has undergone a bunion surgery on the 26. This was followed by a up by podiatry. Not only did she have a can procedure but she also had we will osteotomy of the second metatarsal and second and third fourth toe soft tissue releases. Patient noticed redness and swelling on the fourth day postop and she has been significantly worse. Her cultures were polymicrobial including Enterobacter cloacae and gram-negative lucrecia oxidase positive. She was on 2 different antibiotics but she was not feeling well and she continues to look worse on the clinical presentation with some early softness and moisture to the postop stitches and blueness to at least one of her toes in the form of a blister    She was seen by infectious disease. She hadBedside procedure done to help her move some of the stitches and to allow for the abscesses to have a place to drain. She's feeling a lot better. She is allowed to bear weight on her foot. She will be going home.   Antibiotics will be monitored by infectious disease  Sheis expected to be on Zosyn and Levaquin for 6 weeks    Exam:     BP (!) 141/66   Pulse 52   Temp 98.1 °F (36.7 °C) (Oral)   Resp 18   Ht 5' 1\" (1.549 m)   Wt 180 lb (81.6 kg)   SpO2 98%   BMI 34.01 kg/m²     General appearance: No apparent distress, appears stated age and cooperative. HEENT: Pupils equal, round, and reactive to light. Conjunctivae/corneas clear. Neck:  Trachea midline. Respiratory:  Normal respiratory effort. Clear to auscultation, bilaterally without Rales/Wheezes/Rhonchi. Cardiovascular: Regular rate and rhythm with normal S1/S2 without murmurs, rubs or gallops. Abdomen: Soft, non-tender, non-distended with normal bowel sounds. Musculoskeletal: No clubbing, cyanosis or edema bilaterally. Left foot is dressed                Skin: Skin color, texture, turgor normal.  No rashes or lesions. Neurologic:  Neurovascularly intact without any focal sensory/motor deficits. Cranial nerves: II-XII intact, grossly non-focal.  Psychiatric: Alert and oriented, thought content appropriate, normal insight      Consults:     IP CONSULT TO PODIATRY  IP CONSULT TO INTERNAL MEDICINE  IP CONSULT TO INFECTIOUS DISEASES  IP CONSULT TO INFECTIOUS DISEASES  IP CONSULT TO IV TEAM  IP CONSULT TO DIETITIAN    Significant Diagnostic Studies: XR FOOT LEFT (MIN 3 VIEWS)    Result Date: 11/4/2022  Postsurgical changes. CT FOOT LEFT WO CONTRAST    Result Date: 11/4/2022  1. Satisfactory position of internally fixated fracture involving proximal phalanx of the 1st digit 2. Minimally displaced, oblique fracture involving the 2nd metatarsal bone. Internal fixating screw seen in the distal aspect of 2nd metatarsal bone. 3. Additional surgical clips are seen within plantar soft tissue below 2nd metatarsal bone. 4. No subcutaneous gas or evidence of osteomyelitis. FLUORO FOR SURGICAL PROCEDURES    Result Date: 10/27/2022  Intraoperative left forefoot with fluoroscopy. No complication seen. Please see separate operative report for details. Disposition:  safe     Discharge Instructions/Follow-up:    given    Code Status:  Full Code     Activity: activity as tolerated    Diet: cardiac diet    Labs: For convenience and continuity at follow-up the following most recent labs are provided:      CBC:    Lab Results   Component Value Date/Time    WBC 7.1 11/08/2022 06:50 AM    HGB 12.2 11/08/2022 06:50 AM    HCT 36.7 11/08/2022 06:50 AM     11/08/2022 06:50 AM       Renal:    Lab Results   Component Value Date/Time     11/08/2022 06:50 AM    K 4.0 11/08/2022 06:50 AM     11/08/2022 06:50 AM    CO2 24 11/08/2022 06:50 AM    BUN 13 11/08/2022 06:50 AM    CREATININE 0.9 11/08/2022 06:50 AM    CALCIUM 9.2 11/08/2022 06:50 AM       Discharge Medications:     Current Discharge Medication List             Details   gabapentin (NEURONTIN) 100 MG capsule Take 1 capsule by mouth 2 times daily for 30 days. Qty: 90 capsule, Refills: 3      polyethylene glycol (GLYCOLAX) 17 g packet Take 17 g by mouth daily  Qty: 527 g, Refills: 1      docusate sodium (COLACE, DULCOLAX) 100 MG CAPS Take 100 mg by mouth daily for 10 doses  Qty: 10 capsule, Refills: 0      levoFLOXacin (LEVAQUIN) 750 MG tablet Take 1 tablet by mouth daily  Qty: 40 tablet, Refills: 0      piperacillin-tazobactam (ZOSYN) infusion Infuse 4.5 g intravenously in the morning and 4.5 g at noon and 4.5 g in the evening. Compound per protocol. [place]: 405 g, Refills: 1                Details   metoprolol succinate (TOPROL XL) 25 MG extended release tablet Take 1 tablet by mouth 2 times daily  Qty: 30 tablet, Refills: 3                Details   Omadacycline Tosylate (NUZYRA) 150 MG TABS Take by mouth      HYDROcodone-acetaminophen (NORCO) 5-325 MG per tablet Take 1 tablet by mouth every 4 hours as needed.       levothyroxine (SYNTHROID) 50 MCG tablet Take 50 mcg by mouth Daily      escitalopram (LEXAPRO) 10 MG tablet Take 10 mg by mouth daily      atorvastatin (LIPITOR) 10 MG tablet Take 10 mg by mouth nightly      therapeutic multivitamin-minerals (THERAGRAN-M) tablet Take 1 tablet by mouth daily. Time Spent on discharge is more than 30 minutes in the examination, evaluation, counseling and review of medications and discharge plan.       Signed:    Jennie Benson MD   11/8/2022

## 2022-11-08 NOTE — PROGRESS NOTES
CLINICAL PHARMACY NOTE: MEDS TO BEDS    Total # of Prescriptions Filled: 3   The following medications were delivered to the patient:  Levofloxacin 750  Gabapentin 100  Colace 100    Additional Documentation:

## 2022-11-08 NOTE — PROGRESS NOTES
Internal Medicine Progress Note      Synopsis: Patient admitted on 11/4/2022     Ms. Randolph Brown, a 74y.o. year old female  who  has a past medical history of Hypertension and Thyroid disease. This very pleasant lady has undergone a bunion surgery on the 26. This was followed by a up by podiatry. Not only did she have a can procedure but she also had we will osteotomy of the second metatarsal and second and third fourth toe soft tissue releases. Patient noticed redness and swelling on the fourth day postop and she has been significantly worse. Her cultures were polymicrobial including Enterobacter cloacae and gram-negative lucrecia oxidase positive. She was on 2 different antibiotics but she was not feeling well and she continues to look worse on the clinical presentation with some early softness and moisture to the postop stitches and blueness to at least one of her toes in the form of a blister     Subjective    Clinically improving. Feeling better. Stable overnight. No other overnight issues reported. November 6  She feels slightly worse. Blood pressure is elevated. Her toes are numb and discoloration is worse  Nov 7 th    She has been feeling better   Bedside procedure done she is a lot better  November 8  She still remains feeling better and better. No issues overnight        Exam:  BP (!) 141/66   Pulse 52   Temp 98.1 °F (36.7 °C) (Oral)   Resp 18   Ht 5' 1\" (1.549 m)   Wt 180 lb (81.6 kg)   SpO2 98%   BMI 34.01 kg/m²   General appearance: No apparent distress, appears stated age and cooperative. HEENT: Pupils equal, round, and reactive to light. Conjunctivae/corneas clear. Neck: Supple. No jugular venous distention. Trachea midline. Respiratory:  Normal respiratory effort. Clear to auscultation, bilaterally without Rales/Wheezes/Rhonchi. Cardiovascular: Regular rate and rhythm with normal S1/S2 without murmurs, rubs or gallops.   Abdomen: Soft, non-tender, non-distended with normal bowel sounds. Musculoskeletal: No clubbing, cyanosis   Left foot is wrapped. Discoloration still noted. Drain is a little bit less over the dorsum aspect of the left big toe incision line  Skin:  No rashes    Neurologic: awake, alert and following commands     Medications:  Reviewed    Infusion Medications    sodium chloride       Scheduled Medications    gabapentin  100 mg Oral BID    polyethylene glycol  17 g Oral Daily    docusate sodium  100 mg Oral Daily    metoprolol succinate  25 mg Oral BID    piperacillin-tazobactam  4,500 mg IntraVENous Q8H    levoFLOXacin  750 mg Oral Daily    sodium chloride flush  5-40 mL IntraVENous 2 times per day    heparin flush  3 mL IntraVENous 2 times per day    atorvastatin  10 mg Oral Nightly    escitalopram  10 mg Oral Daily    levothyroxine  50 mcg Oral Daily    enoxaparin  40 mg SubCUTAneous Daily     PRN Meds: acetaminophen, sodium chloride flush, sodium chloride, heparin flush, HYDROcodone-acetaminophen    I/O    Intake/Output Summary (Last 24 hours) at 11/8/2022 1440  Last data filed at 11/7/2022 1723  Gross per 24 hour   Intake 120 ml   Output --   Net 120 ml         Labs:   Recent Labs     11/06/22  1501 11/07/22  0610 11/08/22  0650   WBC 10.0 7.6 7.1   HGB 12.1 10.7* 12.2   HCT 36.4 32.6* 36.7    233 272         Recent Labs     11/06/22  1501 11/07/22  0610 11/08/22  0650    136 140   K 3.5 3.7 4.0    104 106   CO2 22 24 24   BUN 9 11 13   CREATININE 0.8 0.9 0.9   CALCIUM 9.5 9.2 9.2         Recent Labs     11/06/22  1501 11/07/22  0610 11/08/22  0650   PROT 6.6 5.7* 6.3*   ALKPHOS 118* 100 115*   ALT 35* 27 35*   AST 36* 26 34*   BILITOT 1.1 1.0 1.0             Radiology:  XR FOOT LEFT (MIN 3 VIEWS)    Result Date: 11/4/2022  Postsurgical changes. CT FOOT LEFT WO CONTRAST    Result Date: 11/4/2022  1. Satisfactory position of internally fixated fracture involving proximal phalanx of the 1st digit 2.  Minimally displaced, oblique fracture involving the 2nd metatarsal bone. Internal fixating screw seen in the distal aspect of 2nd metatarsal bone. 3. Additional surgical clips are seen within plantar soft tissue below 2nd metatarsal bone. 4. No subcutaneous gas or evidence of osteomyelitis. FLUORO FOR SURGICAL PROCEDURES    Result Date: 10/27/2022  Intraoperative left forefoot with fluoroscopy. No complication seen. Please see separate operative report for details. ASSESSMENT:    Principal Problem:    Post op infection  Active Problems:    Left foot infection    Hypertension    HLD (hyperlipidemia)  Resolved Problems:    * No resolved hospital problems. *       PLAN:  1. Await infectious disease today  2. Maintaining podiatry coverage  3. Follow-up labs awaited  4. Tylenol ordered for mild pain control  November 6  Discussed with infectious disease. We needed podiatry to look at her and possibly consider for surgery. She has a certain amount of darkness to the undersurface of the toes. she is afebrile still. Follow-up labs. Double up on her and hypertensives. Add stool softener. There is also a line of demarcation. Nov 7 th 2022    She is feeling a lot of better    She is in Much less distress. She had a bedside procedure done yesterday which involved cleansing and removal of some sutures. It appears that she had some blisters taken care off and pressure was applied for return of color. Blood pressure is better as well  PICC line is in the right arm. If everything is feeling better then she may be discharged as soon as tomorrow  November 8  She is awaiting discharge planning. PICC line is in place. He 6 weeks up and about etc. Expected. Likely for tomorrow now. Podiatry is following. They're not recommending anything else new for now. Antibiotics continue. Infectious disease is following  Want abscesses positive for Enterobacter cloacae complex heavy growth  Diet: ADULT DIET;  Regular; 4 carb choices (60 gm/meal)  ADULT ORAL NUTRITION SUPPLEMENT; Breakfast, Dinner; Wound Healing Oral Supplement  ADULT ORAL NUTRITION SUPPLEMENT; Lunch, Dinner; Other Oral Supplement; Gelatein 20  Code Status: Full Code  PT/OT Eval Status:   Once cleared by podiatry for weightbearing  DVT Prophylaxis:   In place  Recommended disposition at discharge: Hopefully home    +++++++++++++++++++++++++++++++++++++++++++++++++  Dianna Faust MD   [facility].  +++++++++++++++++++++++++++++++++++++++++++++++++  NOTE: This report was transcribed using voice recognition software. Every effort was made to ensure accuracy; however, inadvertent computerized transcription errors may be present.

## 2022-11-09 VITALS
WEIGHT: 180 LBS | DIASTOLIC BLOOD PRESSURE: 68 MMHG | OXYGEN SATURATION: 97 % | TEMPERATURE: 97.8 F | HEIGHT: 61 IN | SYSTOLIC BLOOD PRESSURE: 146 MMHG | HEART RATE: 61 BPM | RESPIRATION RATE: 19 BRPM | BODY MASS INDEX: 33.99 KG/M2

## 2022-11-09 LAB
BLOOD CULTURE, ROUTINE: NORMAL
CULTURE, BLOOD 2: NORMAL

## 2022-11-09 PROCEDURE — 6370000000 HC RX 637 (ALT 250 FOR IP): Performed by: SPECIALIST

## 2022-11-09 PROCEDURE — 6360000002 HC RX W HCPCS: Performed by: SPECIALIST

## 2022-11-09 PROCEDURE — 97161 PT EVAL LOW COMPLEX 20 MIN: CPT

## 2022-11-09 PROCEDURE — 6360000002 HC RX W HCPCS: Performed by: INTERNAL MEDICINE

## 2022-11-09 PROCEDURE — 97530 THERAPEUTIC ACTIVITIES: CPT

## 2022-11-09 PROCEDURE — 6370000000 HC RX 637 (ALT 250 FOR IP): Performed by: INTERNAL MEDICINE

## 2022-11-09 PROCEDURE — 2580000003 HC RX 258: Performed by: SPECIALIST

## 2022-11-09 RX ADMIN — HYDROCODONE BITARTRATE AND ACETAMINOPHEN 1 TABLET: 5; 325 TABLET ORAL at 13:33

## 2022-11-09 RX ADMIN — PIPERACILLIN AND TAZOBACTAM 4500 MG: 4; .5 INJECTION, POWDER, FOR SOLUTION INTRAVENOUS at 14:04

## 2022-11-09 RX ADMIN — HYDROCODONE BITARTRATE AND ACETAMINOPHEN 1 TABLET: 5; 325 TABLET ORAL at 09:21

## 2022-11-09 RX ADMIN — ESCITALOPRAM OXALATE 10 MG: 10 TABLET ORAL at 09:21

## 2022-11-09 RX ADMIN — HYDROCODONE BITARTRATE AND ACETAMINOPHEN 1 TABLET: 5; 325 TABLET ORAL at 03:05

## 2022-11-09 RX ADMIN — ENOXAPARIN SODIUM 40 MG: 100 INJECTION SUBCUTANEOUS at 09:21

## 2022-11-09 RX ADMIN — PIPERACILLIN AND TAZOBACTAM 4500 MG: 4; .5 INJECTION, POWDER, FOR SOLUTION INTRAVENOUS at 06:18

## 2022-11-09 RX ADMIN — LEVOTHYROXINE SODIUM 50 MCG: 0.05 TABLET ORAL at 06:17

## 2022-11-09 RX ADMIN — GABAPENTIN 100 MG: 100 CAPSULE ORAL at 09:21

## 2022-11-09 RX ADMIN — LEVOFLOXACIN 750 MG: 500 TABLET, FILM COATED ORAL at 09:21

## 2022-11-09 RX ADMIN — METOPROLOL SUCCINATE 25 MG: 25 TABLET, EXTENDED RELEASE ORAL at 09:21

## 2022-11-09 ASSESSMENT — PAIN DESCRIPTION - DESCRIPTORS
DESCRIPTORS: ACHING;THROBBING
DESCRIPTORS: ACHING;DISCOMFORT

## 2022-11-09 ASSESSMENT — PAIN DESCRIPTION - LOCATION
LOCATION: FOOT
LOCATION: FOOT

## 2022-11-09 ASSESSMENT — PAIN DESCRIPTION - ORIENTATION
ORIENTATION: LEFT
ORIENTATION: LEFT

## 2022-11-09 ASSESSMENT — PAIN SCALES - GENERAL
PAINLEVEL_OUTOF10: 9
PAINLEVEL_OUTOF10: 7

## 2022-11-09 NOTE — CARE COORDINATION
Social Work discharge [address] [facility] confirmed with Mayra at [facility] that they WILL see pt at home tomorrow am for first home iv atb dose. Mateo Morel with [facility] advised they willl have iv atb supplies delivered by tomorrow am as well. C is set up and finalized for pt to discharge today after afternoon iv atb dose, as noted ID agreeable to her missing tonight's dose. [facility]   Electronically signed by Jannet Givens on 11/9/2022 at 11:05 AM

## 2022-11-09 NOTE — PROGRESS NOTES
Department of Podiatry  Attending Progress Note      SUBJECTIVE  Patient is lying in bed comfortably. States her foot is feeling pretty good this morning though she has some pain still. She states it is feeling much better overall. Denies any acute events overnight. She states she is ready to go home. Denies n/v/f/chills. OBJECTIVE    Physical    VITALS:  BP (!) 146/76   Pulse 54   Temp 98.1 °F (36.7 °C) (Oral)   Resp 16   Ht 5' 1\" (1.549 m)   Wt 180 lb (81.6 kg)   SpO2 98%   BMI 34.01 kg/m²   CONSTITUTIONAL:  awake, alert, cooperative, no apparent distress, and appears stated age    EXAM:     EXAM:        Pt is AAOx3, NAD     Vascular Exam:  DP and PT pulses are palpable b/l. CFT is unable to be calculated due to ecchymosis and blistering at distal toes. Bilateral lower extremity. Edema is present to left foot. Neuro Exam:  Light touch and protective sensation diminished. Dermatologic Exam: Three separate incisions without dehiscence and skin well coapted. Erythema, edema, and residual ecchymosis present to toes 1,2,3,4. No malodor, crepitus noted. No fluctuance to dorsal lateral foot. No drainage appreciated. MSK: Muscle strength 5/5 Bilateral  ROM is full without pain or crepitation bilateral.  Pain on palpation Bilateral foot/feet. No pain with calf squeeze. Posterior compartments soft and compressible.      Data    CBC with Differential:    Lab Results   Component Value Date/Time    WBC 7.1 11/08/2022 06:50 AM    RBC 4.16 11/08/2022 06:50 AM    HGB 12.2 11/08/2022 06:50 AM    HCT 36.7 11/08/2022 06:50 AM     11/08/2022 06:50 AM    MCV 88.2 11/08/2022 06:50 AM    MCH 29.3 11/08/2022 06:50 AM    MCHC 33.2 11/08/2022 06:50 AM    RDW 13.0 11/08/2022 06:50 AM    LYMPHOPCT 16.0 11/04/2022 03:39 PM    MONOPCT 4.7 11/04/2022 03:39 PM    BASOPCT 0.4 11/04/2022 03:39 PM    MONOSABS 0.45 11/04/2022 03:39 PM    LYMPHSABS 1.54 11/04/2022 03:39 PM    EOSABS 0.05 11/04/2022 03:39 PM BASOSABS 0.04 11/04/2022 03:39 PM     CMP:    Lab Results   Component Value Date/Time     11/08/2022 06:50 AM    K 4.0 11/08/2022 06:50 AM     11/08/2022 06:50 AM    CO2 24 11/08/2022 06:50 AM    BUN 13 11/08/2022 06:50 AM    CREATININE 0.9 11/08/2022 06:50 AM    LABGLOM >60 11/08/2022 06:50 AM    GLUCOSE 105 11/08/2022 06:50 AM    PROT 6.3 11/08/2022 06:50 AM    LABALBU 3.9 11/08/2022 06:50 AM    CALCIUM 9.2 11/08/2022 06:50 AM    BILITOT 1.0 11/08/2022 06:50 AM    ALKPHOS 115 11/08/2022 06:50 AM    AST 34 11/08/2022 06:50 AM    ALT 35 11/08/2022 06:50 AM     Current Inpatient Medications    Current Facility-Administered Medications: gabapentin (NEURONTIN) capsule 100 mg, 100 mg, Oral, BID  polyethylene glycol (GLYCOLAX) packet 17 g, 17 g, Oral, Daily  docusate sodium (COLACE) capsule 100 mg, 100 mg, Oral, Daily  metoprolol succinate (TOPROL XL) extended release tablet 25 mg, 25 mg, Oral, BID  acetaminophen (TYLENOL) tablet 650 mg, 650 mg, Oral, Q4H PRN  piperacillin-tazobactam (ZOSYN) 4,500 mg in dextrose 5 % 100 mL IVPB (vial-mate), 4,500 mg, IntraVENous, Q8H  levoFLOXacin (LEVAQUIN) tablet 750 mg, 750 mg, Oral, Daily  sodium chloride flush 0.9 % injection 5-40 mL, 5-40 mL, IntraVENous, 2 times per day  sodium chloride flush 0.9 % injection 5-40 mL, 5-40 mL, IntraVENous, PRN  0.9 % sodium chloride infusion, , IntraVENous, PRN  heparin flush 100 UNIT/ML injection 300 Units, 3 mL, IntraVENous, 2 times per day  heparin flush 100 UNIT/ML injection 300 Units, 3 mL, Intercatheter, PRN  atorvastatin (LIPITOR) tablet 10 mg, 10 mg, Oral, Nightly  escitalopram (LEXAPRO) tablet 10 mg, 10 mg, Oral, Daily  HYDROcodone-acetaminophen (NORCO) 5-325 MG per tablet 1 tablet, 1 tablet, Oral, Q4H PRN  levothyroxine (SYNTHROID) tablet 50 mcg, 50 mcg, Oral, Daily  enoxaparin (LOVENOX) injection 40 mg, 40 mg, SubCUTAneous, Daily       ASSESSMENT:  -S/p akin procedure, weil osteotomy of 2nd metatarsal and 2nd, 3rd, and 4th soft tissue release DOS 10/26/22  -Post operative surgical site infection --Improved from admission     PLAN:  -Patient examined and evaluated at bedside  -All pertinent labs, charts, and imaging reviewed prior to encounter   -WBC:7.1   -Outpatient wound culture: Enterobacter Cloacae, GNR Oxidase positive  -Repeat wound culture pending: preliminary shows gram negative rods.   -Antibiotics per ID  -L foot CT:   1. Satisfactory position of internally fixated fracture involving proximal   phalanx of the 1st digit   2. Minimally displaced, oblique fracture involving the 2nd metatarsal bone. Internal fixating screw seen in the distal aspect of 2nd metatarsal bone. 3. Additional surgical clips are seen within plantar soft tissue below 2nd   metatarsal bone. 4. No subcutaneous gas or evidence of osteomyelitis.   -L foot x ray shows Postsurgical changes  -Wound irrigated with sterile saline and dressed in DSD  - Cleared for discharge from podiatry perspective pending medical clearance. Patient to follow up within 1 week of discharge with Dr. Enzo Steven.  Dressing to remain clean, dry and intact on discharge until seen in office.  -Pt discussed with Dr. Enzo Steven

## 2022-11-09 NOTE — DISCHARGE SUMMARY
Internal Medicine Discharge Summary    Patient ID: eRbeca Bates      Patient's PCP: Alysia Chacko MD    Admit Date: 11/4/2022     Discharge Date:   11 9 22    Admitting Physician: Alysia Chacko MD     Discharge Physician: Alysia Chacko MD     Discharge Diagnoses: Active Hospital Problems    Diagnosis Date Noted    Left foot infection [L08.9] 11/04/2022     Priority: Medium    Post op infection Ami Medley 11/04/2022     Priority: Medium    Hypertension [I10] 11/04/2022     Priority: Medium    HLD (hyperlipidemia) [E78.5] 11/04/2022     Priority: Medium       The patient was seen and examined on day of discharge and this discharge summary is in conjunction with any daily progress note from day of discharge. Hospital Course:     Ms. Rebeca Bates, a 74y.o. year old female  who  has a past medical history of Hypertension and Thyroid disease. This very pleasant lady has undergone a bunion surgery on the 26. This was followed by a up by podiatry. Not only did she have a can procedure but she also had we will osteotomy of the second metatarsal and second and third fourth toe soft tissue releases. Patient noticed redness and swelling on the fourth day postop and she has been significantly worse. Her cultures were polymicrobial including Enterobacter cloacae and gram-negative lucrecia oxidase positive. She was on 2 different antibiotics but she was not feeling well and she continues to look worse on the clinical presentation with some early softness and moisture to the postop stitches and blueness to at least one of her toes in the form of a blister    She was seen by infectious disease. She hadBedside procedure done to help her move some of the stitches and to allow for the abscesses to have a place to drain. She's feeling a lot better. She is allowed to bear weight on her foot. She will be going home.   Antibiotics will be monitored by infectious disease  Sheis expected to be on Zosyn and for details. Disposition:  safe     Discharge Instructions/Follow-up:    given    Code Status:  Full Code     Activity: activity as tolerated    Diet: cardiac diet    Labs: For convenience and continuity at follow-up the following most recent labs are provided:      CBC:    Lab Results   Component Value Date/Time    WBC 7.1 11/08/2022 06:50 AM    HGB 12.2 11/08/2022 06:50 AM    HCT 36.7 11/08/2022 06:50 AM     11/08/2022 06:50 AM       Renal:    Lab Results   Component Value Date/Time     11/08/2022 06:50 AM    K 4.0 11/08/2022 06:50 AM     11/08/2022 06:50 AM    CO2 24 11/08/2022 06:50 AM    BUN 13 11/08/2022 06:50 AM    CREATININE 0.9 11/08/2022 06:50 AM    CALCIUM 9.2 11/08/2022 06:50 AM       Discharge Medications:     Current Discharge Medication List             Details   gabapentin (NEURONTIN) 100 MG capsule Take 1 capsule by mouth 2 times daily for 30 days. Qty: 90 capsule, Refills: 3      polyethylene glycol (GLYCOLAX) 17 g packet Take 17 g by mouth daily  Qty: 527 g, Refills: 1      docusate sodium (COLACE, DULCOLAX) 100 MG CAPS Take 100 mg by mouth daily for 10 doses  Qty: 10 capsule, Refills: 0      levoFLOXacin (LEVAQUIN) 750 MG tablet Take 1 tablet by mouth daily  Qty: 40 tablet, Refills: 0      piperacillin-tazobactam (ZOSYN) infusion Infuse 4.5 g intravenously in the morning and 4.5 g at noon and 4.5 g in the evening. Compound per protocol. Fernando Filler: 405 g, Refills: 1                Details   metoprolol succinate (TOPROL XL) 25 MG extended release tablet Take 1 tablet by mouth 2 times daily  Qty: 30 tablet, Refills: 3                Details   Omadacycline Tosylate (NUZYRA) 150 MG TABS Take by mouth      HYDROcodone-acetaminophen (NORCO) 5-325 MG per tablet Take 1 tablet by mouth every 4 hours as needed.       levothyroxine (SYNTHROID) 50 MCG tablet Take 50 mcg by mouth Daily      escitalopram (LEXAPRO) 10 MG tablet Take 10 mg by mouth daily      atorvastatin (LIPITOR) 10 MG tablet Take 10 mg by mouth nightly      therapeutic multivitamin-minerals (THERAGRAN-M) tablet Take 1 tablet by mouth daily. Time Spent on discharge is more than 30 minutes in the examination, evaluation, counseling and review of medications and discharge plan.       Signed:    Mikey Doan MD   11/9/2022

## 2022-11-09 NOTE — PROGRESS NOTES
[address] [place] [address] [facility]  VAL  Progress Note    SUBJECTIVE:  Chief Complaint   Patient presents with    Foot Pain     Left foot pain after foot surgery 10/26, was seen today at podiatrist office / confirmed infection today at office, was started on omadacycline     Emesis     X 1 on way to ER      Patient is tolerating medications. No reported adverse drug reactions. No nausea, vomiting, diarrhea. Ready to go home  No new complaints     Review of systems:  As stated above in the chief complaint, otherwise negative. Medications:  Scheduled Meds:   gabapentin  100 mg Oral BID    polyethylene glycol  17 g Oral Daily    docusate sodium  100 mg Oral Daily    metoprolol succinate  25 mg Oral BID    piperacillin-tazobactam  4,500 mg IntraVENous Q8H    levoFLOXacin  750 mg Oral Daily    sodium chloride flush  5-40 mL IntraVENous 2 times per day    heparin flush  3 mL IntraVENous 2 times per day    atorvastatin  10 mg Oral Nightly    escitalopram  10 mg Oral Daily    levothyroxine  50 mcg Oral Daily    enoxaparin  40 mg SubCUTAneous Daily     Continuous Infusions:   sodium chloride       PRN Meds:acetaminophen, sodium chloride flush, sodium chloride, heparin flush, HYDROcodone-acetaminophen    OBJECTIVE:  BP (!) 146/68 Comment: MANUAL  Pulse 61   Temp 97.8 °F (36.6 °C) (Oral)   Resp 19   Ht 5' 1\" (1.549 m)   Wt 180 lb (81.6 kg)   SpO2 97%   BMI 34.01 kg/m²   Temp  Av °F (36.7 °C)  Min: 97.8 °F (36.6 °C)  Max: 98.1 °F (36.7 °C)  Constitutional: The patient is awake, alert, and oriented. In no distress. Skin: Warm and dry. No rashes were noted. HEENT: Round and reactive pupils. Moist mucous membranes. No ulcerations or thrush. Lips feel dry   Neck: Supple to movements. Chest: No respiratory distress. Symmetrical expansion. No wheezing, crackles or rhonchi. Cardiovascular: S1 and S2 are rhythmic and regular. No murmurs appreciated.    Abdomen: Positive bowel sounds to auscultation. Benign to palpation. No masses felt. Extremities: left foot is wrapped.  In dry dressing with walking boot   Lines: PICC Line right arm 11/6/2022 43cm     Laboratory and Tests Review:  Lab Results   Component Value Date    WBC 7.1 11/08/2022    WBC 7.6 11/07/2022    WBC 10.0 11/06/2022    HGB 12.2 11/08/2022    HCT 36.7 11/08/2022    MCV 88.2 11/08/2022     11/08/2022     Lab Results   Component Value Date    NEUTROABS 7.49 (H) 11/04/2022     No results found for: CRPHS  Lab Results   Component Value Date    ALT 35 (H) 11/08/2022    AST 34 (H) 11/08/2022    ALKPHOS 115 (H) 11/08/2022    BILITOT 1.0 11/08/2022     Lab Results   Component Value Date/Time     11/08/2022 06:50 AM    K 4.0 11/08/2022 06:50 AM     11/08/2022 06:50 AM    CO2 24 11/08/2022 06:50 AM    BUN 13 11/08/2022 06:50 AM    CREATININE 0.9 11/08/2022 06:50 AM    CREATININE 0.9 11/07/2022 06:10 AM    CREATININE 0.8 11/06/2022 03:01 PM    LABGLOM >60 11/08/2022 06:50 AM    GLUCOSE 105 11/08/2022 06:50 AM    PROT 6.3 11/08/2022 06:50 AM    LABALBU 3.9 11/08/2022 06:50 AM    CALCIUM 9.2 11/08/2022 06:50 AM    BILITOT 1.0 11/08/2022 06:50 AM    ALKPHOS 115 11/08/2022 06:50 AM    AST 34 11/08/2022 06:50 AM    ALT 35 11/08/2022 06:50 AM     Lab Results   Component Value Date    CRP 2.8 (H) 11/04/2022     Lab Results   Component Value Date    SEDRATE 12 11/04/2022     Radiology:    Microbiology:  Wound culture in 11/1/2022: Enterobacter cloacae (resistant to Augmentin, Cefazolin, Cefoxitin), pansensitive Pseudomonas stutzeri    Blood culture 11/4/2022 x 2: Negative so far  Wound culture 11/4/2022: Enterobacter cloacae (resistant to Augmentin, Cefazolin, Cefoxitin)     Assessment:  Status post reduction of hallux valgus deformity and subluxed second and third left toes correction and fourth toe capsulotomy of the left foot on 10/26/2022  Surgical site infection with possible early hardware infection     Plan:    Continue Zosyn and Levofloxacin x minimum 6 weeks   Cultures reviewed -- antibiotics based on superficial wound cultures and not on surgical, tissue or bone cultures. Med rec complete from ID standpoint - ok to discharge, follow up in our office   Home care can start tomorrow morning - patient can miss tonight's dose of Zosyn to facilitate discharge     MADI Martini CNP  10:35 AM  11/9/2022     Patient seen and examined. I had a face to face encounter with the patient. Agree with exam.  Assessment and plan as outlined above and directed by me. Addition and corrections were done as deemed appropriate. My exam and plan include: The patient is doing better. She has less discomfort and pain and had a better night sleep with gabapentin. She can be discharged. Follow-up in the office. Spoke with nursing.     Cassius Singer MD  11/9/2022  3:13 PM

## 2022-11-09 NOTE — PROGRESS NOTES
Physical Therapy  Facility/Department: [facility] SURG  Physical Therapy Initial Assessment    Name: Brenna Julian  : 1955  MRN: [id number]  Date of Service: 2022      Attending Provider:  Brinda Chang MD    Evaluating PT:  Corey Dunham P.TCharleen Room #:  [id number]  Diagnosis:  Left foot infection [L08.9]  Pertinent PMHx/PSHx:  10/26/22 REDUCTION OF HALLUX VALGUS DEFORMITY AND SUBLUXED 2ND 3RD LEFT TOES CORRECTION OF HALLUX VALGUS BY JAROD PROCEDURE WEIL OSTEOTOMY 2ND METATARSAL SOFT TISSUE RELEASE 3RD METATARSOPHALANGEAL JOINT 4TH TOE CAPSULOTOMY ALL LEFT FOOT  Precautions:  PWB LLE, L CAM boot    SUBJECTIVE:    Pt lives alone in a 1 story [place] with 2 stairs and no rail to enter. Pt has been ambulating with a ww and post-op boot or a knee scooter. OBJECTIVE:   Initial Evaluation  Date: 22 Treatment Short Term/ Long Term   Goals   Was pt agreeable to Eval/treatment? yes     Does pt have pain? -7/10 L foot     Bed Mobility  Rolling: Independent  Supine to sit: Independent  Sit to supine: NA  Scooting: Independent  Independent   Transfers Sit to stand: Independent  Stand to sit: Independent  Stand pivot: Independent with ww and CAM boot  Independent   Ambulation   75 feet, 15 feet x 2 reps, and 8 feet x 3 reps with L CAM boot and ww PWB LLE with supervision  100 feet with L CAM boot and ww PWB LLE Independent    Stair negotiation: ascended and descended 1 step with ww ascending backward and descending forward with ww supervision and 1 step descending backward and ascending forward with ww SBA  1 step with ww ascending backward and descending forward with ww Independent and 1 step descending backward and ascending forward with ww Independent    AM-PAC 6 Clicks        BLE ROM is WFL, except L ankle/foot NA.   BLE strength is grossly 4/5 to 4+/5, except L ankle/foot NA.    Sensation:  Pt denies numbness and tingling to toes L foot  Balance: sitting is Independent and standing with ww is Independent  Endurance: fair+    Patient education  Pt educated on PWB LLE with CAM boot and ww, stairs with ww and CAM boot PWB LLE    Patient response to education:   Pt verbalized understanding Pt demonstrated skill Pt requires further education in this area   yes yes yes     ASSESSMENT:    Conditions Requiring Skilled Therapeutic Intervention:    [x]Decreased strength     []Decreased ROM  [x]Decreased functional mobility  []Decreased balance   [x]Decreased endurance   []Decreased posture  []Decreased sensation  []Decreased coordination   []Decreased vision  []Decreased safety awareness   [x]Increased pain       Comments:  Pt was found in bed with LLE elevated and was agreeable to PT. She states she had surgery last week and was at home using a knee scooter or ww, but after I reviewed she needs to use ww and CAM boot while walking to be able to maintain PWB LLE and on proper gait sequence with ww and on the stairs she states that is not how she was doing it and reported she has learned a lot about how to move and maintain PWB to LLE. Pt was very receptive to instruction and able to safely repeat. Pt was able to elizabeth L CAM boot prior to getting OOB and then sat in chair to elizabeth R sock and tennis shoe. She walked with slow step too pattern maintaining PWB LLE. She was educated on how to safely perform stairs and was taken in [facility] down to [facility] to practice using ww. Pt returned to her room and asked to use BR and walked with ww into BR on her own, transferred on/off commode Independently, performed self hygiene care, and stood at sink Independently while she washed her hands. She walked back to chair next to her bed and sat down. Treatment:  Patient practiced and was instructed in the following treatment:    Bed mobility, transfers, ADLs, gait with ww and CAM boot, and stairs with ww to improve functional strength, safety, and endurance.      Pt was left sitting up in chair with BLE elevated Manual therapy 18586 ** minutes  [x] Therapeutic activities 45049 25 minutes  [] Therapeutic exercises 44754 ** minutes  [] Neuromuscular reeducation 24875 ** minutes     Mahesh Shaffer, P.T.   License Number: [id number]

## 2022-11-09 NOTE — PATIENT CARE CONFERENCE
[facility] Quality Flow/Interdisciplinary Rounds Progress Note        Quality Flow Rounds held on November 9, 2022    Disciplines Attending:  Bedside Nurse, , , and Nursing Unit Leadership    Alvaro Silva was admitted on 11/4/2022  3:49 PM    Anticipated Discharge Date:       Disposition:    Jamal Score:  Jamal Scale Score: 20    Readmission Risk              Risk of Unplanned Readmission:  10           Discussed patient goal for the day, patient clinical progression, and barriers to discharge.   The following Goal(s) of the Day/Commitment(s) have been identified:  Diagnostics - Report Results      Juliette Lanier RN  November 9, 2022

## 2022-11-21 NOTE — PROGRESS NOTES
CLINICAL PHARMACY NOTE: MEDS TO BEDS    Total # of Prescriptions Filled: 1   The following medications were delivered to the patient:  Metorprolol succ 25    Additional Documentation:

## 2022-12-10 ENCOUNTER — APPOINTMENT (OUTPATIENT)
Dept: ULTRASOUND IMAGING | Age: 67
End: 2022-12-10
Payer: MEDICARE

## 2022-12-10 PROCEDURE — 96375 TX/PRO/DX INJ NEW DRUG ADDON: CPT

## 2022-12-10 PROCEDURE — 99284 EMERGENCY DEPT VISIT MOD MDM: CPT

## 2022-12-10 PROCEDURE — 76705 ECHO EXAM OF ABDOMEN: CPT

## 2022-12-10 PROCEDURE — 96374 THER/PROPH/DIAG INJ IV PUSH: CPT

## 2022-12-10 ASSESSMENT — PAIN DESCRIPTION - LOCATION: LOCATION: ABDOMEN

## 2022-12-10 ASSESSMENT — PAIN SCALES - GENERAL: PAINLEVEL_OUTOF10: 10

## 2022-12-10 ASSESSMENT — PAIN DESCRIPTION - FREQUENCY: FREQUENCY: CONTINUOUS

## 2022-12-10 ASSESSMENT — PAIN DESCRIPTION - DESCRIPTORS: DESCRIPTORS: CRAMPING;SHARP

## 2022-12-10 ASSESSMENT — PAIN - FUNCTIONAL ASSESSMENT: PAIN_FUNCTIONAL_ASSESSMENT: 0-10

## 2022-12-10 ASSESSMENT — PAIN DESCRIPTION - PAIN TYPE: TYPE: ACUTE PAIN

## 2022-12-11 ENCOUNTER — HOSPITAL ENCOUNTER (EMERGENCY)
Age: 67
Discharge: HOME | End: 2022-12-11
Attending: EMERGENCY MEDICINE
Payer: MEDICARE

## 2022-12-11 VITALS
SYSTOLIC BLOOD PRESSURE: 165 MMHG | DIASTOLIC BLOOD PRESSURE: 79 MMHG | BODY MASS INDEX: 33.99 KG/M2 | TEMPERATURE: 98.3 F | RESPIRATION RATE: 15 BRPM | OXYGEN SATURATION: 98 % | HEIGHT: 61 IN | HEART RATE: 53 BPM | WEIGHT: 180 LBS

## 2022-12-11 DIAGNOSIS — K21.9 GASTROESOPHAGEAL REFLUX DISEASE, UNSPECIFIED WHETHER ESOPHAGITIS PRESENT: Primary | ICD-10-CM

## 2022-12-11 DIAGNOSIS — R10.13 EPIGASTRIC PAIN: ICD-10-CM

## 2022-12-11 LAB
ALBUMIN SERPL-MCNC: 4.4 G/DL (ref 3.5–5.2)
ALP BLD-CCNC: 117 U/L (ref 35–104)
ALT SERPL-CCNC: 32 U/L (ref 0–32)
ANION GAP SERPL CALCULATED.3IONS-SCNC: 14 MMOL/L (ref 7–16)
AST SERPL-CCNC: 41 U/L (ref 0–31)
BASOPHILS ABSOLUTE: 0.03 E9/L (ref 0–0.2)
BASOPHILS RELATIVE PERCENT: 0.5 % (ref 0–2)
BILIRUB SERPL-MCNC: 1.4 MG/DL (ref 0–1.2)
BILIRUBIN URINE: NEGATIVE
BLOOD, URINE: NEGATIVE
BUN BLDV-MCNC: 10 MG/DL (ref 6–23)
CALCIUM SERPL-MCNC: 9.8 MG/DL (ref 8.6–10.2)
CHLORIDE BLD-SCNC: 105 MMOL/L (ref 98–107)
CLARITY: CLEAR
CO2: 22 MMOL/L (ref 22–29)
COLOR: YELLOW
CREAT SERPL-MCNC: 0.7 MG/DL (ref 0.5–1)
EOSINOPHILS ABSOLUTE: 0.13 E9/L (ref 0.05–0.5)
EOSINOPHILS RELATIVE PERCENT: 2.1 % (ref 0–6)
GFR SERPL CREATININE-BSD FRML MDRD: >60 ML/MIN/1.73
GLUCOSE BLD-MCNC: 97 MG/DL (ref 74–99)
GLUCOSE URINE: NEGATIVE MG/DL
HCT VFR BLD CALC: 36.1 % (ref 34–48)
HEMOGLOBIN: 11.9 G/DL (ref 11.5–15.5)
IMMATURE GRANULOCYTES #: 0 E9/L
IMMATURE GRANULOCYTES %: 0 % (ref 0–5)
KETONES, URINE: ABNORMAL MG/DL
LACTIC ACID: 1.3 MMOL/L (ref 0.5–2.2)
LEUKOCYTE ESTERASE, URINE: NEGATIVE
LIPASE: 35 U/L (ref 13–60)
LYMPHOCYTES ABSOLUTE: 1.92 E9/L (ref 1.5–4)
LYMPHOCYTES RELATIVE PERCENT: 31.6 % (ref 20–42)
MCH RBC QN AUTO: 28.5 PG (ref 26–35)
MCHC RBC AUTO-ENTMCNC: 33 % (ref 32–34.5)
MCV RBC AUTO: 86.6 FL (ref 80–99.9)
MONOCYTES ABSOLUTE: 0.59 E9/L (ref 0.1–0.95)
MONOCYTES RELATIVE PERCENT: 9.7 % (ref 2–12)
NEUTROPHILS ABSOLUTE: 3.41 E9/L (ref 1.8–7.3)
NEUTROPHILS RELATIVE PERCENT: 56.1 % (ref 43–80)
NITRITE, URINE: NEGATIVE
PDW BLD-RTO: 13.2 FL (ref 11.5–15)
PH UA: 6 (ref 5–9)
PLATELET # BLD: 215 E9/L (ref 130–450)
PMV BLD AUTO: 10.2 FL (ref 7–12)
POTASSIUM SERPL-SCNC: 3.8 MMOL/L (ref 3.5–5)
PROTEIN UA: NEGATIVE MG/DL
RBC # BLD: 4.17 E12/L (ref 3.5–5.5)
REASON FOR REJECTION: NORMAL
REJECTED TEST: NORMAL
SODIUM BLD-SCNC: 141 MMOL/L (ref 132–146)
SPECIFIC GRAVITY UA: >=1.03 (ref 1–1.03)
TOTAL PROTEIN: 6.8 G/DL (ref 6.4–8.3)
UROBILINOGEN, URINE: 0.2 E.U./DL
WBC # BLD: 6.1 E9/L (ref 4.5–11.5)

## 2022-12-11 PROCEDURE — 80053 COMPREHEN METABOLIC PANEL: CPT

## 2022-12-11 PROCEDURE — 83690 ASSAY OF LIPASE: CPT

## 2022-12-11 PROCEDURE — 83605 ASSAY OF LACTIC ACID: CPT

## 2022-12-11 PROCEDURE — 2580000003 HC RX 258

## 2022-12-11 PROCEDURE — 6360000002 HC RX W HCPCS

## 2022-12-11 PROCEDURE — 81003 URINALYSIS AUTO W/O SCOPE: CPT

## 2022-12-11 PROCEDURE — 96375 TX/PRO/DX INJ NEW DRUG ADDON: CPT

## 2022-12-11 PROCEDURE — 93005 ELECTROCARDIOGRAM TRACING: CPT | Performed by: EMERGENCY MEDICINE

## 2022-12-11 PROCEDURE — 85025 COMPLETE CBC W/AUTO DIFF WBC: CPT

## 2022-12-11 PROCEDURE — 36415 COLL VENOUS BLD VENIPUNCTURE: CPT

## 2022-12-11 PROCEDURE — 6370000000 HC RX 637 (ALT 250 FOR IP)

## 2022-12-11 PROCEDURE — 96374 THER/PROPH/DIAG INJ IV PUSH: CPT

## 2022-12-11 RX ORDER — 0.9 % SODIUM CHLORIDE 0.9 %
1000 INTRAVENOUS SOLUTION INTRAVENOUS ONCE
Status: COMPLETED | OUTPATIENT
Start: 2022-12-11 | End: 2022-12-11

## 2022-12-11 RX ORDER — ONDANSETRON 2 MG/ML
4 INJECTION INTRAMUSCULAR; INTRAVENOUS ONCE
Status: COMPLETED | OUTPATIENT
Start: 2022-12-11 | End: 2022-12-11

## 2022-12-11 RX ORDER — PANTOPRAZOLE SODIUM 40 MG/1
40 TABLET, DELAYED RELEASE ORAL
Qty: 10 TABLET | Refills: 0 | Status: SHIPPED | OUTPATIENT
Start: 2022-12-11 | End: 2022-12-21

## 2022-12-11 RX ORDER — FENTANYL CITRATE 50 UG/ML
25 INJECTION, SOLUTION INTRAMUSCULAR; INTRAVENOUS ONCE
Status: COMPLETED | OUTPATIENT
Start: 2022-12-11 | End: 2022-12-11

## 2022-12-11 RX ADMIN — ALUMINUM HYDROXIDE, MAGNESIUM HYDROXIDE, AND SIMETHICONE: 200; 200; 20 SUSPENSION ORAL at 05:31

## 2022-12-11 RX ADMIN — SODIUM CHLORIDE 1000 ML: 9 INJECTION, SOLUTION INTRAVENOUS at 07:43

## 2022-12-11 RX ADMIN — ONDANSETRON 4 MG: 2 INJECTION INTRAMUSCULAR; INTRAVENOUS at 06:10

## 2022-12-11 RX ADMIN — FENTANYL CITRATE 25 MCG: 50 INJECTION, SOLUTION INTRAMUSCULAR; INTRAVENOUS at 06:07

## 2022-12-11 NOTE — DISCHARGE INSTRUCTIONS
Please take your new medications as they are prescribed. We recommend to follow-up with your primary care physician to follow-up on her symptoms and discuss your recent emergency room visit. Please return to the emergency room if experience worsening of her symptoms, severe nausea with vomiting, severe abdominal pain, severe chest pain, severe shortness of breath, or fevers greater than 100.4 °F.

## 2022-12-11 NOTE — ED PROVIDER NOTES
62-year-old female with history of hiatal hernia, hypertension, hyperlipidemia and thyroid disease and recent left foot infection. She has a PICC line placed and follows Dr. Nikko Ball outpatient, and recently diagnosed with GERD and put on pantoprazole. She has been having upper abdominal pain since Tuesday with gradual onset. She describes the pain as achy feeling that is nonradiating. Symptoms are worsened with food. She also reports episodes of emesis today that is nonbloody nonbilious. Her family doctor prescribed her pantoprazole but has not been complete with her symptoms. She states taking pantoprazole at noon after her first meal.  She denies falling: Fever, congestion, chest pain, shortness of breath, urinary symptoms, change in bowel habits, GI bleed. Chief Complaint   Patient presents with    Abdominal Pain     epigastric pain x\"few days\", emesis today    Emesis       Review of Systems   Stated in the HPI above  Physical Exam  Constitutional:       Appearance: She is well-developed. She is not ill-appearing or diaphoretic. HENT:      Head: Normocephalic and atraumatic. Cardiovascular:      Rate and Rhythm: Normal rate and regular rhythm. Heart sounds: No murmur heard. Pulmonary:      Effort: Pulmonary effort is normal.      Breath sounds: Normal breath sounds. Abdominal:      Palpations: Abdomen is rigid. Tenderness: There is generalized abdominal tenderness and tenderness in the epigastric area. There is no guarding or rebound. Negative signs include Traylor's sign and Rovsing's sign. Musculoskeletal:      Comments: Left brachial PICC line  Right foot wound is wrapped patient has a boot on   Skin:     General: Skin is warm. Capillary Refill: Capillary refill takes less than 2 seconds. Neurological:      General: No focal deficit present. Mental Status: She is alert and oriented to person, place, and time. Motor: No weakness.    Psychiatric:         Mood and Affect: Mood normal.         Behavior: Behavior normal.        Procedures     EKG: This EKG is signed by emergency department physician. Rate: 50  Rhythm: Sinus  AXIS:regular left axis  ST Changes:No st elevations   Interpretation: sinus bradycardia  Comparison: stable as compared to patient's most recent EKG     MDM     60-year-old female with history of hiatal hernia, hypertension, hyperlipidemia and thyroid disease and recent left foot infection. She has a PICC line placed and follows Dr. Sarahy Guerin outpatient, and recently diagnosed with GERD and put on pantoprazole. She has been having upper abdominal pain since Tuesday with gradual onset. She describes the pain as achy feeling that is nonradiating. Symptoms are worsened with food. She also reports episodes of emesis today that is nonbloody nonbilious. Her family doctor prescribed her pantoprazole but has not been complete with her symptoms. She states taking pantoprazole at noon after her first meal.  She denies falling: Fever, congestion, chest pain, shortness of breath, urinary symptoms, change in bowel habits, GI bleed. Upon entering the room patient is hemodynamically stable in no acute distress she is nauseous. Heart and lung markable for acute pathology. Abdomen is soft no rigidity, no guarding. There is general tenderness with the most being in the epigastric region. I asked the patient how she takes pantoprazole which she states she takes it before her first meal.  I consulted her to that taking her proton pump inhibitor after her first meal renders the medicine ineffective for acid is already produced. I recommended the patient to take the medicine at least 30 to 45 minutes before her first meal.  She expressed her understanding. Ultrasound of the right upper quadrant gallbladder shows no acute process. She is given fluids, pain meds, GI cocktail and Zofran which have improved her symptoms.  WBC normal, lactic normal. Lipase normal. LFTs are not concerning. RUQ U/S shows no acute abnormality. Impression is GERD with inadequate administration of her Protonix She is consoled on taking medicine before her first meal. Pleases see ED course below. ED Course as of 12/11/22 1535   Sun Dec 11, 2022   0715 Reviewed not worrisome. Patient is ultrasound for gallbladder was unremarkable for acute findings. Patient states she is feeling better with GI cocktail Sublimaze and antiemetics. [JN]   7787 Patient was reevaluated and explained the results of her blood work and her imaging. Patient was reexamined and her abdomen is nontender. Patient experiencing significant relief of her nausea and pain at this time. Patient is comfortable with outpatient follow-up. We will discharge patient with Protonix. [VG]      ED Course User Index  [JN] Yaneli Jett DO  [VG] Marguerite Dotson MD        ED Course as of 12/11/22 1536   Sun Dec 11, 2022   0715 Reviewed not worrisome. Patient is ultrasound for gallbladder was unremarkable for acute findings. Patient states she is feeling better with GI cocktail Sublimaze and antiemetics. [JN]   6221 Patient was reevaluated and explained the results of her blood work and her imaging. Patient was reexamined and her abdomen is nontender. Patient experiencing significant relief of her nausea and pain at this time. Patient is comfortable with outpatient follow-up. We will discharge patient with Protonix. [VG]      ED Course User Index  [JN] Yaneli Jett DO  [IZZY] Marguerite Dotson MD       --------------------------------------------- PAST HISTORY ---------------------------------------------  Past Medical History:  has a past medical history of Hypertension and Thyroid disease. Past Surgical History:  has a past surgical history that includes Tubal ligation; Hysterectomy; LASIK (Bilateral, 2015); osteotomy (Left, 10/26/2022); Foot surgery (Right); and picc insertion vascular access team (11/6/2022).     Social History: reports that she has never smoked. She has never used smokeless tobacco. She reports that she does not currently use alcohol. She reports that she does not use drugs. Family History: family history is not on file. The patients home medications have been reviewed.     Allergies: Betadine [povidone-iodine]    -------------------------------------------------- RESULTS -------------------------------------------------  Labs:  Results for orders placed or performed during the hospital encounter of 12/11/22   Comprehensive Metabolic Panel   Result Value Ref Range    Sodium 141 132 - 146 mmol/L    Potassium 3.8 3.5 - 5.0 mmol/L    Chloride 105 98 - 107 mmol/L    CO2 22 22 - 29 mmol/L    Anion Gap 14 7 - 16 mmol/L    Glucose 97 74 - 99 mg/dL    BUN 10 6 - 23 mg/dL    Creatinine 0.7 0.5 - 1.0 mg/dL    Est, Glom Filt Rate >60 >=60 mL/min/1.73    Calcium 9.8 8.6 - 10.2 mg/dL    Total Protein 6.8 6.4 - 8.3 g/dL    Albumin 4.4 3.5 - 5.2 g/dL    Total Bilirubin 1.4 (H) 0.0 - 1.2 mg/dL    Alkaline Phosphatase 117 (H) 35 - 104 U/L    ALT 32 0 - 32 U/L    AST 41 (H) 0 - 31 U/L   Lipase   Result Value Ref Range    Lipase 35 13 - 60 U/L   Lactic Acid   Result Value Ref Range    Lactic Acid 1.3 0.5 - 2.2 mmol/L   Urinalysis   Result Value Ref Range    Color, UA Yellow Straw/Yellow    Clarity, UA Clear Clear    Glucose, Ur Negative Negative mg/dL    Bilirubin Urine Negative Negative    Ketones, Urine TRACE (A) Negative mg/dL    Specific Gravity, UA >=1.030 1.005 - 1.030    Blood, Urine Negative Negative    pH, UA 6.0 5.0 - 9.0    Protein, UA Negative Negative mg/dL    Urobilinogen, Urine 0.2 <2.0 E.U./dL    Nitrite, Urine Negative Negative    Leukocyte Esterase, Urine Negative Negative   SPECIMEN REJECTION   Result Value Ref Range    Rejected Test cbcwd     Reason for Rejection see below    CBC with Auto Differential   Result Value Ref Range    WBC 6.1 4.5 - 11.5 E9/L    RBC 4.17 3.50 - 5.50 E12/L    Hemoglobin 11.9 11.5 - 15.5 g/dL    Hematocrit 36.1 34.0 - 48.0 %    MCV 86.6 80.0 - 99.9 fL    MCH 28.5 26.0 - 35.0 pg    MCHC 33.0 32.0 - 34.5 %    RDW 13.2 11.5 - 15.0 fL    Platelets 531 044 - 832 E9/L    MPV 10.2 7.0 - 12.0 fL    Neutrophils % 56.1 43.0 - 80.0 %    Immature Granulocytes % 0.0 0.0 - 5.0 %    Lymphocytes % 31.6 20.0 - 42.0 %    Monocytes % 9.7 2.0 - 12.0 %    Eosinophils % 2.1 0.0 - 6.0 %    Basophils % 0.5 0.0 - 2.0 %    Neutrophils Absolute 3.41 1.80 - 7.30 E9/L    Immature Granulocytes # 0.00 E9/L    Lymphocytes Absolute 1.92 1.50 - 4.00 E9/L    Monocytes Absolute 0.59 0.10 - 0.95 E9/L    Eosinophils Absolute 0.13 0.05 - 0.50 E9/L    Basophils Absolute 0.03 0.00 - 0.20 E9/L   EKG 12 Lead   Result Value Ref Range    Ventricular Rate 50 BPM    Atrial Rate 50 BPM    P-R Interval 150 ms    QRS Duration 82 ms    Q-T Interval 494 ms    QTc Calculation (Bazett) 450 ms    P Axis 30 degrees    R Axis 4 degrees    T Axis 23 degrees       Radiology:  US GALLBLADDER RUQ   Final Result   No acute abnormality as visualized. ------------------------- NURSING NOTES AND VITALS REVIEWED ---------------------------  Date / Time Roomed:  12/11/2022  3:37 AM  ED Bed Assignment:  AUTUMN/AUTUMN    The nursing notes within the ED encounter and vital signs as below have been reviewed. BP (!) 165/79   Pulse 53   Temp 98.3 °F (36.8 °C) (Oral)   Resp 15   Ht 5' 1\" (1.549 m)   Wt 180 lb (81.6 kg)   SpO2 98%   BMI 34.01 kg/m²   Oxygen Saturation Interpretation: Normal      ------------------------------------------ PROGRESS NOTES ------------------------------------------  I have spoken with the patient and discussed todays results, in addition to providing specific details for the plan of care and counseling regarding the diagnosis and prognosis. Their questions are answered at this time and they are agreeable with the plan. I discussed at length with them reasons for immediate return here for re evaluation.  They will followup with primary care by calling their office tomorrow. --------------------------------- ADDITIONAL PROVIDER NOTES ---------------------------------  At this time the patient is without objective evidence of an acute process requiring hospitalization or inpatient management. They have remained hemodynamically stable throughout their entire ED visit and are stable for discharge with outpatient follow-up. The plan has been discussed in detail and they are aware of the specific conditions for emergent return, as well as the importance of follow-up. Discharge Medication List as of 12/11/2022  8:28 AM        START taking these medications    Details   pantoprazole (PROTONIX) 40 MG tablet Take 1 tablet by mouth every morning (before breakfast) for 10 days, Disp-10 tablet, R-0Print             Diagnosis:  1. Gastroesophageal reflux disease, unspecified whether esophagitis present    2. Epigastric pain        Disposition:  Patient's disposition: Discharge to home  Patient's condition is stable. Attending was present and available throughout encounter including all critical portions;  See Attending Note/Attestation for Final Plan      Brodie Wang DO  Resident  12/11/22 [id number]

## 2022-12-11 NOTE — ED NOTES
Department of Emergency Medicine  FIRST PROVIDER TRIAGE NOTE             Independent MLP           12/10/22  10:19 PM EST    Date of Encounter: 12/10/22   MRN: [id number]      HPI: Sarah Stapleton is a 74 y.o. female who presents to the ED for Abdominal Pain (epigastric pain x\"few days\", emesis today) and Emesis   PT presents to ED with epigastric pain x 3 days with vomiting today. She is currently on antibiotics for foot infection post operative (both oral and PICC). She reports some loose stools, but not copious, brown, not bloody. Denies fevers or chills. Pt reports pain is 10. Vitals:    12/10/22 2013   BP: (!) 158/107   Pulse: 57   Resp: 16   Temp: 98.3 °F (36.8 °C)   SpO2: 99%       ROS: Negative for cp, sob, fever, cough, or urinary complaints. PE: Gen Appearance/Constitutional: alert  CV: regular rate  Pulm: CTA bilat  GI: tender to palpation, epigastric RUQ. Initial Plan of Care: All treatment areas with department are currently occupied. Plan to order/Initiate the following while awaiting opening in ED: labs, imaging studies, and ultrasound.   Initiate Treatment-Testing, Proceed toTreatment Area When Bed Available for ED Attending/MLP to Continue Care    Electronically signed by Orlin Quiles PA-C   DD: 12/10/22     Orlin Quiles PA-C  12/10/22 [id number]

## 2022-12-11 NOTE — ED NOTES
Report given to WANDER HOLLIDAY [facility].       Thomas Moreno RN  12/11/22 [id number]

## 2022-12-12 LAB
EKG ATRIAL RATE: 50 BPM
EKG P AXIS: 30 DEGREES
EKG P-R INTERVAL: 150 MS
EKG Q-T INTERVAL: 494 MS
EKG QRS DURATION: 82 MS
EKG QTC CALCULATION (BAZETT): 450 MS
EKG R AXIS: 4 DEGREES
EKG T AXIS: 23 DEGREES
EKG VENTRICULAR RATE: 50 BPM

## 2022-12-12 PROCEDURE — 93010 ELECTROCARDIOGRAM REPORT: CPT | Performed by: INTERNAL MEDICINE

## (undated) DEVICE — DRILL BIT: Brand: DART-FIRE

## (undated) DEVICE — INTENDED FOR TISSUE SEPARATION, AND OTHER PROCEDURES THAT REQUIRE A SHARP SURGICAL BLADE TO PUNCTURE OR CUT.: Brand: BARD-PARKER ® STAINLESS STEEL BLADES

## (undated) DEVICE — SYRINGE, LUER LOCK, 10ML: Brand: MEDLINE

## (undated) DEVICE — BANDAGE,GAUZE,CONFORMING,3"X75",STRL,LF: Brand: MEDLINE

## (undated) DEVICE — SOLUTION IRRIG 1000ML 0.9% SOD CHL USP POUR PLAS BTL

## (undated) DEVICE — C-ARM: Brand: UNBRANDED

## (undated) DEVICE — ELECTRODE PT RET AD L9FT HI MOIST COND ADH HYDRGEL CORDED

## (undated) DEVICE — NEEDLE,25GX1.5",REG,BEVEL: Brand: MEDLINE

## (undated) DEVICE — DRESSING,GAUZE,XEROFORM,CURAD,1"X8",ST: Brand: CURAD

## (undated) DEVICE — SOLUTION IV IRRIG POUR BRL 0.9% SODIUM CHL 2F7124

## (undated) DEVICE — TOWEL,OR,DSP,ST,BLUE,STD,6/PK,12PK/CS: Brand: MEDLINE

## (undated) DEVICE — BASIC SINGLE BASIN 1-LF: Brand: MEDLINE INDUSTRIES, INC.

## (undated) DEVICE — SYSTEM TPS ORTHO

## (undated) DEVICE — NEEDLE FLTR 18GA L1.5IN MEM THK5UM BLNT DISP

## (undated) DEVICE — TUBING, SUCTION, 9/32" X 10', STRAIGHT: Brand: MEDLINE

## (undated) DEVICE — SYRINGE WITH HYPODERMIC SAFETY NEEDLE: Brand: MAGELLAN

## (undated) DEVICE — SYRINGE MED 10ML POLYPR LUERSLIP TIP FLAT TOP W/O SFTY DISP

## (undated) DEVICE — K WIRE FIX L6IN DIA0.035IN
Type: IMPLANTABLE DEVICE | Site: FOOT | Status: NON-FUNCTIONAL
Removed: 2022-10-26

## (undated) DEVICE — DRAPE,EXTREMITY,89X128,STERILE: Brand: MEDLINE

## (undated) DEVICE — NEEDLE HYPO 27GA L1.25IN GRY POLYPR HUB S STL REG BVL STR

## (undated) DEVICE — GAUZE,SPONGE,4"X4",8PLY,STRL,LF,10/TRAY: Brand: MEDLINE

## (undated) DEVICE — GLOVE ORANGE PI 7   MSG9070

## (undated) DEVICE — GOWN,SIRUS,FABRNF,L,20/CS: Brand: MEDLINE

## (undated) DEVICE — TAPE ADH W0.5INXL10YD CLTH SILK H2O RESIST CURAD

## (undated) DEVICE — SINGLE USE DEVICE INTENDED TO COVER EXPOSED ENDS OF ORTHOPEDIC PIN AND K-WIRES TO HELP PROTECT THE EXPOSED WIRE FROM SNAGGING ON CLOTHING.: Brand: OXBORO™ PIN COVER

## (undated) DEVICE — PAD,NON-ADHERENT,2X3,STERILE,LF,1/PK: Brand: MEDLINE

## (undated) DEVICE — APPLICATOR PREP 26ML 0.7% IOD POVACRYLEX 74% ISO ALC ST

## (undated) DEVICE — 4-PORT MANIFOLD: Brand: NEPTUNE 2

## (undated) DEVICE — SYRINGE, LUER LOCK, 5ML: Brand: MEDLINE

## (undated) DEVICE — BLUNTFILL: Brand: MONOJECT

## (undated) DEVICE — BNDG,ELSTC,MATRIX,STRL,4"X5YD,LF,HOOK&LP: Brand: MEDLINE

## (undated) DEVICE — MARKER,SKIN,WI/RULER AND LABELS: Brand: MEDLINE

## (undated) DEVICE — EXTRAS PLASTIC

## (undated) DEVICE — SINGLE TROCAR WIRE
Type: IMPLANTABLE DEVICE | Site: FOOT | Status: NON-FUNCTIONAL
Brand: CHARLOTTE
Removed: 2022-10-26

## (undated) DEVICE — SYRINGE 20ML LL S/C 50

## (undated) DEVICE — COVER,MAYO STAND,STERILE: Brand: MEDLINE

## (undated) DEVICE — BLADE OPHTH ORNG GRINDLESS SMALLER ALTERNATIVE TO NO15 GEN

## (undated) DEVICE — PRECISION THIN (9.0 X 0.38 X 25.0MM)

## (undated) DEVICE — TUBING SUCT 12FR MAL ALUM SHFT FN CAP VENT UNIV CONN W/ OBT

## (undated) DEVICE — BANDAGE,GAUZE,BULKEE II,4.5"X4.1YD,STRL: Brand: MEDLINE

## (undated) DEVICE — GOWN,SIRUS,FABRNF,XL,20/CS: Brand: MEDLINE